# Patient Record
Sex: FEMALE | Race: WHITE | NOT HISPANIC OR LATINO | Employment: OTHER | URBAN - METROPOLITAN AREA
[De-identification: names, ages, dates, MRNs, and addresses within clinical notes are randomized per-mention and may not be internally consistent; named-entity substitution may affect disease eponyms.]

---

## 2018-03-06 PROBLEM — R04.0 RECURRENT EPISTAXIS: Status: ACTIVE | Noted: 2018-03-06

## 2018-03-06 PROBLEM — I26.92 ACUTE SADDLE PULMONARY EMBOLISM WITHOUT ACUTE COR PULMONALE: Status: ACTIVE | Noted: 2018-03-06

## 2018-03-06 PROBLEM — I82.412 ACUTE DEEP VEIN THROMBOSIS (DVT) OF FEMORAL VEIN OF LEFT LOWER EXTREMITY: Status: ACTIVE | Noted: 2018-03-06

## 2018-03-06 PROBLEM — Z86.2 H/O IRON DEFICIENCY ANEMIA: Status: ACTIVE | Noted: 2018-03-06

## 2018-03-06 PROBLEM — I26.99 OTHER PULMONARY EMBOLISM WITHOUT ACUTE COR PULMONALE: Status: ACTIVE | Noted: 2018-03-06

## 2018-03-06 PROBLEM — I82.4Y2 ACUTE DEEP VEIN THROMBOSIS (DVT) OF PROXIMAL VEIN OF LEFT LOWER EXTREMITY: Status: ACTIVE | Noted: 2018-03-06

## 2020-07-13 ENCOUNTER — OFFICE VISIT (OUTPATIENT)
Dept: OBSTETRICS AND GYNECOLOGY | Facility: CLINIC | Age: 65
End: 2020-07-13
Payer: COMMERCIAL

## 2020-07-13 VITALS — BODY MASS INDEX: 27.46 KG/M2 | WEIGHT: 165 LBS

## 2020-07-13 DIAGNOSIS — Z90.711 S/P LAPAROSCOPIC SUPRACERVICAL HYSTERECTOMY: ICD-10-CM

## 2020-07-13 DIAGNOSIS — N94.10 DYSPAREUNIA IN FEMALE: ICD-10-CM

## 2020-07-13 DIAGNOSIS — Z01.419 GYNECOLOGIC EXAM NORMAL: ICD-10-CM

## 2020-07-13 DIAGNOSIS — N95.2 ATROPHIC VAGINITIS: Primary | ICD-10-CM

## 2020-07-13 PROCEDURE — 87624 HPV HI-RISK TYP POOLED RSLT: CPT

## 2020-07-13 PROCEDURE — 99999 PR PBB SHADOW E&M-NEW PATIENT-LVL III: ICD-10-PCS | Mod: PBBFAC,,, | Performed by: OBSTETRICS & GYNECOLOGY

## 2020-07-13 PROCEDURE — 99386 PREV VISIT NEW AGE 40-64: CPT | Mod: S$GLB,,, | Performed by: OBSTETRICS & GYNECOLOGY

## 2020-07-13 PROCEDURE — 99999 PR PBB SHADOW E&M-NEW PATIENT-LVL III: CPT | Mod: PBBFAC,,, | Performed by: OBSTETRICS & GYNECOLOGY

## 2020-07-13 PROCEDURE — 3008F BODY MASS INDEX DOCD: CPT | Mod: CPTII,S$GLB,, | Performed by: OBSTETRICS & GYNECOLOGY

## 2020-07-13 PROCEDURE — 88175 CYTOPATH C/V AUTO FLUID REDO: CPT

## 2020-07-13 PROCEDURE — 3008F PR BODY MASS INDEX (BMI) DOCUMENTED: ICD-10-PCS | Mod: CPTII,S$GLB,, | Performed by: OBSTETRICS & GYNECOLOGY

## 2020-07-13 PROCEDURE — 99386 PR PREVENTIVE VISIT,NEW,40-64: ICD-10-PCS | Mod: S$GLB,,, | Performed by: OBSTETRICS & GYNECOLOGY

## 2020-07-13 RX ORDER — ESTRADIOL 10 UG/1
10 INSERT VAGINAL WEEKLY
Qty: 4 TABLET | Refills: 1 | Status: SHIPPED | OUTPATIENT
Start: 2020-07-13 | End: 2020-09-14 | Stop reason: SDUPTHER

## 2020-07-13 NOTE — PATIENT INSTRUCTIONS
Please try Miralax, 1 capful daily (aka ClearLax at [facility]).    During Vagifem treatment, a minor degree of systemic absorption may occur in some patients, especially during the first two weeks of once-daily administration. However, average plasma E2 concentrations (Cave (0-24)) at all evaluated days remained within the normal postmenopausal range in all subjects (see section 5.2).    Vagifem 10 micrograms vaginal tablets - Summary of Product ...[email or web address]  emc  product  smpc

## 2020-07-20 LAB
HPV HR 12 DNA SPEC QL NAA+PROBE: NEGATIVE
HPV16 AG SPEC QL: NEGATIVE
HPV18 DNA SPEC QL NAA+PROBE: NEGATIVE

## 2020-07-23 LAB
FINAL PATHOLOGIC DIAGNOSIS: NORMAL
Lab: NORMAL

## 2020-09-14 DIAGNOSIS — N95.2 ATROPHIC VAGINITIS: ICD-10-CM

## 2020-09-14 DIAGNOSIS — N94.10 DYSPAREUNIA IN FEMALE: ICD-10-CM

## 2020-09-14 RX ORDER — ESTRADIOL 10 UG/1
10 INSERT VAGINAL WEEKLY
Qty: 4 TABLET | Refills: 9 | Status: SHIPPED | OUTPATIENT
Start: 2020-09-14 | End: 2021-10-02

## 2020-09-14 NOTE — TELEPHONE ENCOUNTER
----- Message from Lon Hagan sent at 9/14/2020  3:10 PM CDT -----  Regarding: estradioL (VAGIFEM) 10 mcg Tab  Type:  RX Refill Request    Who Called:  patient  Refill or New Rx: refill  RX Name and Strength: estradioL (VAGIFEM) 10 mcg Tab  How is the patient currently taking it? (ex. 1XDay): 1Xweek  Is this a 30 day or 90 day RX: 30 day  Preferred Pharmacy with phone number: [facility] [place] [address] [address]  Local or Mail Order: Local  Ordering Provider: Rio  Would the patient rather a call back or a response via [facility]?  Call back  Best Call Back Number: [phone]  Additional Information:  she use her last dose tomorrow so needs a refill until she sees the doctor in 2 weeks

## 2020-09-14 NOTE — TELEPHONE ENCOUNTER
Patient rescheduled appointment for October 2 and would like to receive a refill of her medication until her appointment.

## 2020-09-22 DIAGNOSIS — Z12.31 ENCOUNTER FOR SCREENING MAMMOGRAM FOR MALIGNANT NEOPLASM OF BREAST: Primary | ICD-10-CM

## 2020-10-13 ENCOUNTER — HOSPITAL ENCOUNTER (OUTPATIENT)
Dept: RADIOLOGY | Facility: HOSPITAL | Age: 65
Discharge: HOME | End: 2020-10-13
Attending: INTERNAL MEDICINE
Payer: COMMERCIAL

## 2020-10-13 VITALS — BODY MASS INDEX: 27.49 KG/M2 | HEIGHT: 65 IN | WEIGHT: 165 LBS

## 2020-10-13 DIAGNOSIS — Z12.31 ENCOUNTER FOR SCREENING MAMMOGRAM FOR MALIGNANT NEOPLASM OF BREAST: ICD-10-CM

## 2020-10-13 PROCEDURE — 77067 SCR MAMMO BI INCL CAD: CPT | Mod: TC

## 2021-02-24 ENCOUNTER — IMMUNIZATION (OUTPATIENT)
Dept: OBSTETRICS AND GYNECOLOGY | Facility: CLINIC | Age: 66
End: 2021-02-24
Payer: COMMERCIAL

## 2021-02-24 DIAGNOSIS — Z23 NEED FOR VACCINATION: Primary | ICD-10-CM

## 2021-02-24 PROCEDURE — 91300 COVID-19, MRNA, LNP-S, PF, 30 MCG/0.3 ML DOSE VACCINE: CPT | Mod: PBBFAC | Performed by: ANESTHESIOLOGY

## 2021-03-17 ENCOUNTER — IMMUNIZATION (OUTPATIENT)
Dept: OBSTETRICS AND GYNECOLOGY | Facility: CLINIC | Age: 66
End: 2021-03-17
Payer: COMMERCIAL

## 2021-03-17 DIAGNOSIS — Z23 NEED FOR VACCINATION: Primary | ICD-10-CM

## 2021-03-17 PROCEDURE — 0002A COVID-19, MRNA, LNP-S, PF, 30 MCG/0.3 ML DOSE VACCINE: CPT | Mod: CV19,,, | Performed by: ANESTHESIOLOGY

## 2021-03-17 PROCEDURE — 91300 COVID-19, MRNA, LNP-S, PF, 30 MCG/0.3 ML DOSE VACCINE: ICD-10-PCS | Mod: ,,, | Performed by: ANESTHESIOLOGY

## 2021-03-17 PROCEDURE — 0002A COVID-19, MRNA, LNP-S, PF, 30 MCG/0.3 ML DOSE VACCINE: ICD-10-PCS | Mod: CV19,,, | Performed by: ANESTHESIOLOGY

## 2021-03-17 PROCEDURE — 91300 COVID-19, MRNA, LNP-S, PF, 30 MCG/0.3 ML DOSE VACCINE: CPT | Mod: ,,, | Performed by: ANESTHESIOLOGY

## 2021-10-14 ENCOUNTER — IMMUNIZATION (OUTPATIENT)
Dept: OBSTETRICS AND GYNECOLOGY | Facility: CLINIC | Age: 66
End: 2021-10-14
Payer: COMMERCIAL

## 2021-10-14 DIAGNOSIS — Z12.31 ENCOUNTER FOR SCREENING MAMMOGRAM FOR MALIGNANT NEOPLASM OF BREAST: Primary | ICD-10-CM

## 2021-10-14 DIAGNOSIS — Z23 NEED FOR VACCINATION: Primary | ICD-10-CM

## 2021-10-14 PROCEDURE — 0003A COVID-19, MRNA, LNP-S, PF, 30 MCG/0.3 ML DOSE VACCINE: CPT | Mod: PBBFAC | Performed by: ANESTHESIOLOGY

## 2021-10-14 PROCEDURE — 91300 COVID-19, MRNA, LNP-S, PF, 30 MCG/0.3 ML DOSE VACCINE: CPT | Mod: PBBFAC | Performed by: ANESTHESIOLOGY

## 2021-10-15 ENCOUNTER — HOSPITAL ENCOUNTER (OUTPATIENT)
Dept: RADIOLOGY | Facility: HOSPITAL | Age: 66
Discharge: HOME | End: 2021-10-15
Attending: INTERNAL MEDICINE
Payer: COMMERCIAL

## 2021-10-15 VITALS — BODY MASS INDEX: 27.49 KG/M2 | HEIGHT: 65 IN | WEIGHT: 165 LBS

## 2021-10-15 DIAGNOSIS — Z12.31 ENCOUNTER FOR SCREENING MAMMOGRAM FOR MALIGNANT NEOPLASM OF BREAST: ICD-10-CM

## 2021-10-15 PROCEDURE — 77067 SCR MAMMO BI INCL CAD: CPT | Mod: TC

## 2021-11-24 ENCOUNTER — TELEPHONE (OUTPATIENT)
Dept: OBSTETRICS AND GYNECOLOGY | Facility: CLINIC | Age: 66
End: 2021-11-24
Payer: COMMERCIAL

## 2022-03-16 ENCOUNTER — LAB VISIT (OUTPATIENT)
Dept: LAB | Facility: HOSPITAL | Age: 67
End: 2022-03-16
Attending: INTERNAL MEDICINE
Payer: COMMERCIAL

## 2022-03-16 DIAGNOSIS — I10 ESSENTIAL HYPERTENSION, MALIGNANT: ICD-10-CM

## 2022-03-16 DIAGNOSIS — E53.0 RIBOFLAVIN DEFICIENCY: ICD-10-CM

## 2022-03-16 DIAGNOSIS — G47.61 PERIODIC LIMB MOVEMENT DISORDER: ICD-10-CM

## 2022-03-16 DIAGNOSIS — D64.9 ANEMIA, UNSPECIFIED: Primary | ICD-10-CM

## 2022-03-16 DIAGNOSIS — T81.718A IATROGENIC PULMONARY EMBOLISM AND INFARCTION: ICD-10-CM

## 2022-03-16 DIAGNOSIS — I26.99 IATROGENIC PULMONARY EMBOLISM AND INFARCTION: ICD-10-CM

## 2022-03-16 LAB
ALBUMIN SERPL BCP-MCNC: 4.2 G/DL (ref 3.5–5.2)
ALP SERPL-CCNC: 97 U/L (ref 55–135)
ALT SERPL W/O P-5'-P-CCNC: 25 U/L (ref 10–44)
ANION GAP SERPL CALC-SCNC: 6 MMOL/L (ref 8–16)
AST SERPL-CCNC: 13 U/L (ref 10–40)
BASOPHILS # BLD AUTO: 0.07 K/UL (ref 0–0.2)
BASOPHILS NFR BLD: 1.1 % (ref 0–1.9)
BILIRUB SERPL-MCNC: 0.5 MG/DL (ref 0.1–1)
BUN SERPL-MCNC: 13 MG/DL (ref 8–23)
CALCIUM SERPL-MCNC: 9.3 MG/DL (ref 8.7–10.5)
CHLORIDE SERPL-SCNC: 104 MMOL/L (ref 95–110)
CO2 SERPL-SCNC: 28 MMOL/L (ref 23–29)
CREAT SERPL-MCNC: 0.7 MG/DL (ref 0.5–1.4)
DIFFERENTIAL METHOD: ABNORMAL
EOSINOPHIL # BLD AUTO: 0.1 K/UL (ref 0–0.5)
EOSINOPHIL NFR BLD: 1.8 % (ref 0–8)
ERYTHROCYTE [DISTWIDTH] IN BLOOD BY AUTOMATED COUNT: 12.6 % (ref 11.5–14.5)
EST. GFR  (AFRICAN AMERICAN): >60 ML/MIN/1.73 M^2
EST. GFR  (NON AFRICAN AMERICAN): >60 ML/MIN/1.73 M^2
FERRITIN SERPL-MCNC: 35 NG/ML (ref 20–300)
GLUCOSE SERPL-MCNC: 103 MG/DL (ref 70–110)
HCT VFR BLD AUTO: 48.6 % (ref 37–48.5)
HGB BLD-MCNC: 15.4 G/DL (ref 12–16)
IMM GRANULOCYTES # BLD AUTO: 0.03 K/UL (ref 0–0.04)
IMM GRANULOCYTES NFR BLD AUTO: 0.5 % (ref 0–0.5)
LYMPHOCYTES # BLD AUTO: 2.4 K/UL (ref 1–4.8)
LYMPHOCYTES NFR BLD: 37.3 % (ref 18–48)
MCH RBC QN AUTO: 28.9 PG (ref 27–31)
MCHC RBC AUTO-ENTMCNC: 31.7 G/DL (ref 32–36)
MCV RBC AUTO: 91 FL (ref 82–98)
MONOCYTES # BLD AUTO: 0.5 K/UL (ref 0.3–1)
MONOCYTES NFR BLD: 7.3 % (ref 4–15)
NEUTROPHILS # BLD AUTO: 3.4 K/UL (ref 1.8–7.7)
NEUTROPHILS NFR BLD: 52 % (ref 38–73)
NRBC BLD-RTO: 0 /100 WBC
PLATELET # BLD AUTO: 314 K/UL (ref 150–450)
PMV BLD AUTO: 10.9 FL (ref 9.2–12.9)
POTASSIUM SERPL-SCNC: 4.5 MMOL/L (ref 3.5–5.1)
PROT SERPL-MCNC: 8.2 G/DL (ref 6–8.4)
RBC # BLD AUTO: 5.32 M/UL (ref 4–5.4)
SODIUM SERPL-SCNC: 138 MMOL/L (ref 136–145)
TSH SERPL DL<=0.005 MIU/L-ACNC: 2.47 UIU/ML (ref 0.4–4)
VIT B12 SERPL-MCNC: 114 PG/ML (ref 210–950)
WBC # BLD AUTO: 6.55 K/UL (ref 3.9–12.7)

## 2022-03-16 PROCEDURE — 36415 COLL VENOUS BLD VENIPUNCTURE: CPT | Performed by: INTERNAL MEDICINE

## 2022-03-16 PROCEDURE — 82607 VITAMIN B-12: CPT | Performed by: INTERNAL MEDICINE

## 2022-03-16 PROCEDURE — 84443 ASSAY THYROID STIM HORMONE: CPT | Performed by: INTERNAL MEDICINE

## 2022-03-16 PROCEDURE — 82728 ASSAY OF FERRITIN: CPT | Performed by: INTERNAL MEDICINE

## 2022-03-16 PROCEDURE — 83921 ORGANIC ACID SINGLE QUANT: CPT | Performed by: INTERNAL MEDICINE

## 2022-03-16 PROCEDURE — 80053 COMPREHEN METABOLIC PANEL: CPT | Performed by: INTERNAL MEDICINE

## 2022-03-16 PROCEDURE — 85025 COMPLETE CBC W/AUTO DIFF WBC: CPT | Performed by: INTERNAL MEDICINE

## 2022-03-22 LAB — METHYLMALONATE SERPL-SCNC: 0.44 UMOL/L

## 2022-04-25 ENCOUNTER — HOSPITAL ENCOUNTER (EMERGENCY)
Facility: HOSPITAL | Age: 67
Discharge: HOME | End: 2022-04-25
Attending: EMERGENCY MEDICINE
Payer: COMMERCIAL

## 2022-04-25 VITALS
HEART RATE: 77 BPM | RESPIRATION RATE: 16 BRPM | WEIGHT: 174 LBS | SYSTOLIC BLOOD PRESSURE: 151 MMHG | BODY MASS INDEX: 28.96 KG/M2 | OXYGEN SATURATION: 97 % | TEMPERATURE: 98 F | DIASTOLIC BLOOD PRESSURE: 72 MMHG

## 2022-04-25 DIAGNOSIS — R04.0 RIGHT-SIDED EPISTAXIS: Primary | ICD-10-CM

## 2022-04-25 DIAGNOSIS — R04.0 RECURRENT EPISTAXIS: ICD-10-CM

## 2022-04-25 LAB
ALBUMIN SERPL BCP-MCNC: 3.7 G/DL (ref 3.5–5.2)
ALP SERPL-CCNC: 100 U/L (ref 55–135)
ALT SERPL W/O P-5'-P-CCNC: 19 U/L (ref 10–44)
ANION GAP SERPL CALC-SCNC: 7 MMOL/L (ref 8–16)
AST SERPL-CCNC: 11 U/L (ref 10–40)
BASOPHILS # BLD AUTO: 0.06 K/UL (ref 0–0.2)
BASOPHILS NFR BLD: 0.8 % (ref 0–1.9)
BILIRUB SERPL-MCNC: 0.3 MG/DL (ref 0.1–1)
BUN SERPL-MCNC: 16 MG/DL (ref 8–23)
CALCIUM SERPL-MCNC: 9.2 MG/DL (ref 8.7–10.5)
CHLORIDE SERPL-SCNC: 110 MMOL/L (ref 95–110)
CO2 SERPL-SCNC: 25 MMOL/L (ref 23–29)
CREAT SERPL-MCNC: 0.8 MG/DL (ref 0.5–1.4)
DIFFERENTIAL METHOD: NORMAL
EOSINOPHIL # BLD AUTO: 0.2 K/UL (ref 0–0.5)
EOSINOPHIL NFR BLD: 2.2 % (ref 0–8)
ERYTHROCYTE [DISTWIDTH] IN BLOOD BY AUTOMATED COUNT: 12.8 % (ref 11.5–14.5)
EST. GFR  (AFRICAN AMERICAN): >60 ML/MIN/1.73 M^2
EST. GFR  (NON AFRICAN AMERICAN): >60 ML/MIN/1.73 M^2
GLUCOSE SERPL-MCNC: 152 MG/DL (ref 70–110)
HCT VFR BLD AUTO: 43.8 % (ref 37–48.5)
HGB BLD-MCNC: 14.5 G/DL (ref 12–16)
IMM GRANULOCYTES # BLD AUTO: 0.03 K/UL (ref 0–0.04)
IMM GRANULOCYTES NFR BLD AUTO: 0.4 % (ref 0–0.5)
INR PPP: 1 (ref 0.8–1.2)
LYMPHOCYTES # BLD AUTO: 3.2 K/UL (ref 1–4.8)
LYMPHOCYTES NFR BLD: 42.1 % (ref 18–48)
MCH RBC QN AUTO: 29.1 PG (ref 27–31)
MCHC RBC AUTO-ENTMCNC: 33.1 G/DL (ref 32–36)
MCV RBC AUTO: 88 FL (ref 82–98)
MONOCYTES # BLD AUTO: 0.6 K/UL (ref 0.3–1)
MONOCYTES NFR BLD: 7.2 % (ref 4–15)
NEUTROPHILS # BLD AUTO: 3.6 K/UL (ref 1.8–7.7)
NEUTROPHILS NFR BLD: 47.3 % (ref 38–73)
NRBC BLD-RTO: 0 /100 WBC
PLATELET # BLD AUTO: 289 K/UL (ref 150–450)
PMV BLD AUTO: 9.9 FL (ref 9.2–12.9)
POTASSIUM SERPL-SCNC: 3.3 MMOL/L (ref 3.5–5.1)
PROT SERPL-MCNC: 7.8 G/DL (ref 6–8.4)
PROTHROMBIN TIME: 11.2 SEC (ref 9–12.5)
RBC # BLD AUTO: 4.98 M/UL (ref 4–5.4)
SODIUM SERPL-SCNC: 142 MMOL/L (ref 136–145)
WBC # BLD AUTO: 7.65 K/UL (ref 3.9–12.7)

## 2022-04-25 PROCEDURE — 30901 CONTROL OF NOSEBLEED: CPT | Mod: RT

## 2022-04-25 PROCEDURE — 85025 COMPLETE CBC W/AUTO DIFF WBC: CPT | Performed by: EMERGENCY MEDICINE

## 2022-04-25 PROCEDURE — 80053 COMPREHEN METABOLIC PANEL: CPT | Performed by: EMERGENCY MEDICINE

## 2022-04-25 PROCEDURE — 85610 PROTHROMBIN TIME: CPT | Performed by: EMERGENCY MEDICINE

## 2022-04-25 PROCEDURE — 25000003 PHARM REV CODE 250: Performed by: EMERGENCY MEDICINE

## 2022-04-25 PROCEDURE — 36415 COLL VENOUS BLD VENIPUNCTURE: CPT | Performed by: EMERGENCY MEDICINE

## 2022-04-25 PROCEDURE — 99283 EMERGENCY DEPT VISIT LOW MDM: CPT | Mod: 25

## 2022-04-25 RX ORDER — ONDANSETRON 4 MG/1
8 TABLET, ORALLY DISINTEGRATING ORAL
Status: COMPLETED | OUTPATIENT
Start: 2022-04-25 | End: 2022-04-25

## 2022-04-25 RX ADMIN — ONDANSETRON 8 MG: 4 TABLET, ORALLY DISINTEGRATING ORAL at 08:04

## 2022-04-25 RX ADMIN — PHENYLEPHRINE HYDROCHLORIDE 2 SPRAY: 0.5 SPRAY NASAL at 08:04

## 2022-04-26 NOTE — DISCHARGE INSTRUCTIONS
Apply an ice pack for approximately 15 minutes to your nasal bridge before sleep tonight.  Use over-the-counter Tylenol for pain.    Follow-up with your ENT tomorrow morning.      Use Ollie-Synephrine once every 6 hours to the left naris until seen by ENT

## 2022-04-26 NOTE — ED PROVIDER NOTES
[facility] 4[facility]       History     Chief Complaint   Patient presents with    Epistaxis     Epistaxis x 30 minutes. Denies trauma. Reports history of nosebleeds. On blood thinners.     Patient is 66-year-old female presenting to the emergency department with a nose bleed.  Patient has a history of DVT and PE who was on lifelong blood thinners who also has a history of recurrent nose bleeds.  She states that she had a spontaneous onset of bleeding from her right naris.  She denies any trauma she denies picking her nose she denies the use of any nasal sprays or medications or allergy medications.    She stated that she has held pressure without any stopping she tried using Afrin with no relief of symptoms.    She denies fever chills vomiting diarrhea constipation chest pain shortness of breath headache blurry vision.  She does report some mild nausea.    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    Recurrent epistaxis 3/6/2018     Past Surgical History:   Procedure Laterality Date    HEEL SPUR SURGERY      HYSTERECTOMY      WRIST SURGERY       Family History   Problem Relation Age of Onset    Coronary artery disease Mother     Tuberculosis Mother     Diabetes Father     Coronary artery disease Father     Cancer Sister     Coronary artery disease Sister     Hypertension Sister     Cancer Brother     No Known Problems Daughter     No  Known Problems Maternal Aunt     No Known Problems Maternal Uncle     No Known Problems Paternal Aunt     No Known Problems Paternal Uncle     No Known Problems Maternal Grandmother     No Known Problems Maternal Grandfather     No Known Problems Paternal Grandmother     No Known Problems Paternal Grandfather     Breast cancer Neg Hx     Ovarian cancer Neg Hx     BRCA 1/2 Neg Hx      Social History     Tobacco Use    Smoking status: Never Smoker    Smokeless tobacco: Never Used   Substance Use Topics    Alcohol use: Yes    Drug use: No     Review of Systems   Constitutional: Negative.    HENT: Negative.    Eyes: Negative.    Respiratory: Negative.    Cardiovascular: Negative.    Gastrointestinal: Negative.    Endocrine: Negative.    Genitourinary: Negative.    Musculoskeletal: Negative.    Allergic/Immunologic: Negative.    Neurological: Negative.    Hematological: Negative.    Psychiatric/Behavioral: Negative.    All other systems reviewed and are negative.      Physical Exam     Initial Vitals [04/25/22 1930]   BP Pulse Resp Temp SpO2   (!) 196/105 (!) 125 18 98.2 °F (36.8 °C) 97 %      MAP       --         Physical Exam    Nursing note and vitals reviewed.  Constitutional: She appears well-developed and well-nourished. No distress.   HENT:   Head: Normocephalic and atraumatic.   Nose: Nose normal.   Mouth/Throat: Oropharynx is clear and moist.   Steady bleeding from right naris.  Limited examination secondary to blood but no clear source of bleeding no clear evidence of septal hematoma or abrasion or vascular injury.  Left naris clear no bleeding.  No septal hematoma no abrasions or lesions noted   Eyes: Conjunctivae and EOM are normal. Pupils are equal, round, and reactive to light.   Neck: Neck supple.   Normal range of motion.  Cardiovascular: Regular rhythm.   Mild tachycardia   Pulmonary/Chest: Breath sounds normal. No respiratory distress. She has no wheezes.   Abdominal: Abdomen is soft.  Bowel sounds are normal.   Musculoskeletal:         General: Normal range of motion.      Cervical back: Normal range of motion and neck supple.     Neurological: She is alert and oriented to person, place, and time. She has normal strength.   Skin: Skin is warm and dry.         ED Course   Epistaxis Mgmt    Date/Time: 4/25/2022 7:50 PM  Performed by: Pan Castellano MD  Authorized by: Pan Castellano MD   Consent Done: Not Needed  Repair method: nasal balloon  Post-procedure assessment: bleeding stopped  Treatment complexity: simple  Patient tolerance: Patient tolerated the procedure well with no immediate complications        Labs Reviewed   COMPREHENSIVE METABOLIC PANEL - Abnormal; Notable for the following components:       Result Value    Potassium 3.3 (*)     Glucose 152 (*)     Anion Gap 7 (*)     All other components within normal limits   CBC W/ AUTO DIFFERENTIAL   PROTIME-INR          Imaging Results    None          Medications   phenylephrine HCL 0.5% nasal spray 2 spray (2 sprays Each Nostril Given 4/25/22 2009)   ondansetron disintegrating tablet 8 mg (8 mg Oral Given 4/25/22 2009)     Medical Decision Making:   Initial Assessment:   Patient is a 66-year-old female presenting to the emergency department with a nose bleed.  Patient has had nosebleeds in the past she is on blood thinners for DVTs and PEs.  She tells me that she has had no trauma she does not pick her nose she has not been using any medications that could cause mucosal irritation or drying.    She tried with simple nasal pressure at home with no relief.    Here in the emergency department I applied nasal bridge pressure for approximately 7-10 minutes absolutely 0 improvement in symptoms the patient stated blood was going down the back of her throat she was gagging.    I placed a 5.5 cm anterior nasal balloon.  Patient tolerated the procedure well.  Bleeding appears to have stopped.  Will give the patient some nausea medication will  check basic blood work including an INR and I will place Ollie-Synephrine into the left naris.  Will continue to monitor the patient here in the emergency department.  Pan Dykes  Attending ED  4/25/2022  8:08 PM    Patient has been monitored in the emergency department for approximately 1 hour post anterior rhino rocket placement.  Patient no longer has any bleeding.  On recheck the  balloon it appears as if the nose has accommodated to the balloon so additional air was added patient tolerated well.    Had a long discussion with the patient.  Described that we did basic blood work no gross abnormalities.  The plan will be discharge the patient says she has a local ENT I advised the patient to sleep upright in a chair this evening to prevent rolling over and accidentally pulling out the balloon.  Advised the patient take over-the-counter Tylenol for pain advised the patient to call and follow up with her ENT in the morning.    Patient voiced understanding of discharge plan return precautions and need for follow-up                        Clinical Impression:   Final diagnoses:  [R04.0] Right-sided epistaxis (Primary)  [R04.0] Recurrent epistaxis          ED Disposition Condition    Discharge Stable        ED Prescriptions     None        Follow-up Information     Follow up With Specialties Details Why Contact Info    Joy Abraham MD Internal Medicine In 1 month Call for appointment [address] [facility] [address]  [phone]      Emiliano Phipps III, MD Otolaryngology In 1 day Call for appointment [address] [facility]  [place] [address]  [phone]             Pan Dykes MD  04/25/22 [id number]

## 2022-06-17 PROBLEM — G25.81 RESTLESS LEG SYNDROME: Status: ACTIVE | Noted: 2022-06-17

## 2022-06-17 PROBLEM — E03.9 HYPOTHYROIDISM: Status: ACTIVE | Noted: 2022-06-17

## 2022-06-17 PROBLEM — M85.80 OSTEOPENIA: Status: ACTIVE | Noted: 2022-06-17

## 2022-06-17 PROBLEM — R73.01 IMPAIRED FASTING GLUCOSE: Status: ACTIVE | Noted: 2022-06-17

## 2022-06-17 PROBLEM — I10 BENIGN ESSENTIAL HYPERTENSION: Status: ACTIVE | Noted: 2022-06-17

## 2022-08-19 ENCOUNTER — IMMUNIZATION (OUTPATIENT)
Dept: PRIMARY CARE CLINIC | Facility: CLINIC | Age: 67
End: 2022-08-19
Payer: COMMERCIAL

## 2022-08-19 DIAGNOSIS — Z23 NEED FOR VACCINATION: Primary | ICD-10-CM

## 2022-08-19 PROCEDURE — 0054A COVID-19, MRNA, LNP-S, PF, 30 MCG/0.3 ML DOSE VACCINE (PFIZER): CPT | Mod: PBBFAC | Performed by: ANESTHESIOLOGY

## 2022-09-19 ENCOUNTER — LAB VISIT (OUTPATIENT)
Dept: LAB | Facility: HOSPITAL | Age: 67
End: 2022-09-19
Attending: INTERNAL MEDICINE
Payer: COMMERCIAL

## 2022-09-19 DIAGNOSIS — R73.01 IMPAIRED FASTING GLUCOSE: ICD-10-CM

## 2022-09-19 DIAGNOSIS — I10 HYPERTENSION, UNSPECIFIED TYPE: ICD-10-CM

## 2022-09-19 DIAGNOSIS — E53.8 VITAMIN B12 DEFICIENCY: ICD-10-CM

## 2022-09-19 DIAGNOSIS — E78.5 HYPERLIPIDEMIA, UNSPECIFIED HYPERLIPIDEMIA TYPE: ICD-10-CM

## 2022-09-19 DIAGNOSIS — E03.9 HYPOTHYROIDISM, UNSPECIFIED TYPE: ICD-10-CM

## 2022-09-19 LAB
ALBUMIN SERPL BCP-MCNC: 3.6 G/DL (ref 3.5–5.2)
ALP SERPL-CCNC: 89 U/L (ref 55–135)
ALT SERPL W/O P-5'-P-CCNC: 21 U/L (ref 10–44)
ANION GAP SERPL CALC-SCNC: 7 MMOL/L (ref 8–16)
AST SERPL-CCNC: 14 U/L (ref 10–40)
BASOPHILS # BLD AUTO: 0.05 K/UL (ref 0–0.2)
BASOPHILS NFR BLD: 0.8 % (ref 0–1.9)
BILIRUB SERPL-MCNC: 0.5 MG/DL (ref 0.1–1)
BUN SERPL-MCNC: 12 MG/DL (ref 8–23)
CALCIUM SERPL-MCNC: 9.1 MG/DL (ref 8.7–10.5)
CHLORIDE SERPL-SCNC: 108 MMOL/L (ref 95–110)
CHOLEST SERPL-MCNC: 147 MG/DL (ref 120–199)
CHOLEST/HDLC SERPL: 3.1 {RATIO} (ref 2–5)
CO2 SERPL-SCNC: 25 MMOL/L (ref 23–29)
CREAT SERPL-MCNC: 0.7 MG/DL (ref 0.5–1.4)
DIFFERENTIAL METHOD: NORMAL
EOSINOPHIL # BLD AUTO: 0.2 K/UL (ref 0–0.5)
EOSINOPHIL NFR BLD: 3.1 % (ref 0–8)
ERYTHROCYTE [DISTWIDTH] IN BLOOD BY AUTOMATED COUNT: 13.1 % (ref 11.5–14.5)
EST. GFR  (NO RACE VARIABLE): >60 ML/MIN/1.73 M^2
GLUCOSE SERPL-MCNC: 110 MG/DL (ref 70–110)
HCT VFR BLD AUTO: 44.2 % (ref 37–48.5)
HDLC SERPL-MCNC: 47 MG/DL (ref 40–75)
HDLC SERPL: 32 % (ref 20–50)
HGB BLD-MCNC: 14.2 G/DL (ref 12–16)
IMM GRANULOCYTES # BLD AUTO: 0.03 K/UL (ref 0–0.04)
IMM GRANULOCYTES NFR BLD AUTO: 0.5 % (ref 0–0.5)
LDLC SERPL CALC-MCNC: 74.8 MG/DL (ref 63–159)
LYMPHOCYTES # BLD AUTO: 3 K/UL (ref 1–4.8)
LYMPHOCYTES NFR BLD: 47 % (ref 18–48)
MCH RBC QN AUTO: 28.1 PG (ref 27–31)
MCHC RBC AUTO-ENTMCNC: 32.1 G/DL (ref 32–36)
MCV RBC AUTO: 87 FL (ref 82–98)
MONOCYTES # BLD AUTO: 0.5 K/UL (ref 0.3–1)
MONOCYTES NFR BLD: 7.7 % (ref 4–15)
NEUTROPHILS # BLD AUTO: 2.6 K/UL (ref 1.8–7.7)
NEUTROPHILS NFR BLD: 40.9 % (ref 38–73)
NONHDLC SERPL-MCNC: 100 MG/DL
NRBC BLD-RTO: 0 /100 WBC
PLATELET # BLD AUTO: 295 K/UL (ref 150–450)
PMV BLD AUTO: 10.5 FL (ref 9.2–12.9)
POTASSIUM SERPL-SCNC: 3.7 MMOL/L (ref 3.5–5.1)
PROT SERPL-MCNC: 7.8 G/DL (ref 6–8.4)
RBC # BLD AUTO: 5.06 M/UL (ref 4–5.4)
SODIUM SERPL-SCNC: 140 MMOL/L (ref 136–145)
T4 FREE SERPL-MCNC: 0.99 NG/DL (ref 0.71–1.51)
TRIGL SERPL-MCNC: 126 MG/DL (ref 30–150)
TSH SERPL DL<=0.005 MIU/L-ACNC: 4.03 UIU/ML (ref 0.4–4)
VIT B12 SERPL-MCNC: 427 PG/ML (ref 210–950)
WBC # BLD AUTO: 6.36 K/UL (ref 3.9–12.7)

## 2022-09-19 PROCEDURE — 36415 COLL VENOUS BLD VENIPUNCTURE: CPT | Performed by: INTERNAL MEDICINE

## 2022-09-19 PROCEDURE — 85025 COMPLETE CBC W/AUTO DIFF WBC: CPT | Performed by: INTERNAL MEDICINE

## 2022-09-19 PROCEDURE — 80053 COMPREHEN METABOLIC PANEL: CPT | Performed by: INTERNAL MEDICINE

## 2022-09-19 PROCEDURE — 82607 VITAMIN B-12: CPT | Performed by: INTERNAL MEDICINE

## 2022-09-19 PROCEDURE — 84439 ASSAY OF FREE THYROXINE: CPT | Performed by: INTERNAL MEDICINE

## 2022-09-19 PROCEDURE — 80061 LIPID PANEL: CPT | Performed by: INTERNAL MEDICINE

## 2022-09-19 PROCEDURE — 84443 ASSAY THYROID STIM HORMONE: CPT | Performed by: INTERNAL MEDICINE

## 2022-09-26 PROBLEM — Z00.00 WELLNESS EXAMINATION: Status: ACTIVE | Noted: 2022-09-26

## 2022-10-20 ENCOUNTER — HOSPITAL ENCOUNTER (OUTPATIENT)
Dept: RADIOLOGY | Facility: HOSPITAL | Age: 67
Discharge: HOME | End: 2022-10-20
Attending: INTERNAL MEDICINE
Payer: COMMERCIAL

## 2022-10-20 VITALS — WEIGHT: 174 LBS | HEIGHT: 65 IN | BODY MASS INDEX: 28.99 KG/M2

## 2022-10-20 DIAGNOSIS — Z12.31 SCREENING MAMMOGRAM FOR BREAST CANCER: ICD-10-CM

## 2022-10-20 DIAGNOSIS — Z78.0 POSTMENOPAUSAL: ICD-10-CM

## 2022-10-20 PROCEDURE — 77067 SCR MAMMO BI INCL CAD: CPT | Mod: TC

## 2022-10-20 PROCEDURE — 77063 BREAST TOMOSYNTHESIS BI: CPT | Mod: TC

## 2022-10-20 PROCEDURE — 77080 DXA BONE DENSITY AXIAL: CPT | Mod: TC

## 2022-12-26 PROBLEM — Z00.00 WELLNESS EXAMINATION: Status: RESOLVED | Noted: 2022-09-26 | Resolved: 2022-12-26

## 2023-04-18 PROBLEM — I48.91 ATRIAL FIBRILLATION: Status: ACTIVE | Noted: 2023-04-18

## 2023-06-16 PROBLEM — I48.0 PAROXYSMAL ATRIAL FIBRILLATION: Status: ACTIVE | Noted: 2023-04-18

## 2023-10-04 ENCOUNTER — PATIENT MESSAGE (OUTPATIENT)
Dept: ADMINISTRATIVE | Facility: OTHER | Age: 68
End: 2023-10-04
Payer: COMMERCIAL

## 2023-10-20 ENCOUNTER — LAB VISIT (OUTPATIENT)
Dept: LAB | Facility: HOSPITAL | Age: 68
End: 2023-10-20
Attending: INTERNAL MEDICINE
Payer: COMMERCIAL

## 2023-10-20 DIAGNOSIS — Z13.29 SCREENING FOR THYROID DISORDER: ICD-10-CM

## 2023-10-20 DIAGNOSIS — Z00.00 ROUTINE GENERAL MEDICAL EXAMINATION AT A HEALTH CARE FACILITY: ICD-10-CM

## 2023-10-20 DIAGNOSIS — Z13.1 SCREENING FOR DIABETES MELLITUS: ICD-10-CM

## 2023-10-20 DIAGNOSIS — Z13.220 SCREENING FOR LIPOID DISORDERS: ICD-10-CM

## 2023-10-20 DIAGNOSIS — Z13.21 SCREENING FOR MALNUTRITION: ICD-10-CM

## 2023-10-20 DIAGNOSIS — Z13.0 SCREENING FOR IRON DEFICIENCY ANEMIA: ICD-10-CM

## 2023-10-20 LAB
ALBUMIN SERPL BCP-MCNC: 3.6 G/DL (ref 3.5–5.2)
ALP SERPL-CCNC: 126 U/L (ref 55–135)
ALT SERPL W/O P-5'-P-CCNC: 31 U/L (ref 10–44)
ANION GAP SERPL CALC-SCNC: 6 MMOL/L (ref 3–11)
AST SERPL-CCNC: 11 U/L (ref 10–40)
BASOPHILS # BLD AUTO: 0.09 K/UL (ref 0–0.2)
BASOPHILS NFR BLD: 0.8 % (ref 0–1.9)
BILIRUB SERPL-MCNC: 0.4 MG/DL (ref 0.1–1)
BUN SERPL-MCNC: 20 MG/DL (ref 8–23)
CALCIUM SERPL-MCNC: 8.9 MG/DL (ref 8.7–10.5)
CHLORIDE SERPL-SCNC: 108 MMOL/L (ref 95–110)
CHOLEST SERPL-MCNC: 137 MG/DL (ref 120–199)
CHOLEST/HDLC SERPL: 2.8 {RATIO} (ref 2–5)
CO2 SERPL-SCNC: 25 MMOL/L (ref 23–29)
CREAT SERPL-MCNC: 0.8 MG/DL (ref 0.5–1.4)
DIFFERENTIAL METHOD: ABNORMAL
EOSINOPHIL # BLD AUTO: 0.3 K/UL (ref 0–0.5)
EOSINOPHIL NFR BLD: 2.2 % (ref 0–8)
ERYTHROCYTE [DISTWIDTH] IN BLOOD BY AUTOMATED COUNT: 13.2 % (ref 11.5–14.5)
EST. GFR  (NO RACE VARIABLE): >60 ML/MIN/1.73 M^2
GLUCOSE SERPL-MCNC: 102 MG/DL (ref 70–110)
HCT VFR BLD AUTO: 48.3 % (ref 37–48.5)
HDLC SERPL-MCNC: 49 MG/DL (ref 40–75)
HDLC SERPL: 35.8 % (ref 20–50)
HGB BLD-MCNC: 15.3 G/DL (ref 12–16)
IMM GRANULOCYTES # BLD AUTO: 0.12 K/UL (ref 0–0.04)
IMM GRANULOCYTES NFR BLD AUTO: 1 % (ref 0–0.5)
LDLC SERPL CALC-MCNC: 66.8 MG/DL (ref 63–159)
LYMPHOCYTES # BLD AUTO: 4.5 K/UL (ref 1–4.8)
LYMPHOCYTES NFR BLD: 38.3 % (ref 18–48)
MCH RBC QN AUTO: 28.1 PG (ref 27–31)
MCHC RBC AUTO-ENTMCNC: 31.7 G/DL (ref 32–36)
MCV RBC AUTO: 89 FL (ref 82–98)
MONOCYTES # BLD AUTO: 0.8 K/UL (ref 0.3–1)
MONOCYTES NFR BLD: 6.6 % (ref 4–15)
NEUTROPHILS # BLD AUTO: 6 K/UL (ref 1.8–7.7)
NEUTROPHILS NFR BLD: 51.1 % (ref 38–73)
NONHDLC SERPL-MCNC: 88 MG/DL
NRBC BLD-RTO: 0 /100 WBC
PLATELET # BLD AUTO: 342 K/UL (ref 150–450)
PMV BLD AUTO: 10.2 FL (ref 9.2–12.9)
POTASSIUM SERPL-SCNC: 3.9 MMOL/L (ref 3.5–5.1)
PROT SERPL-MCNC: 7.7 G/DL (ref 6–8.4)
RBC # BLD AUTO: 5.44 M/UL (ref 4–5.4)
SODIUM SERPL-SCNC: 139 MMOL/L (ref 136–145)
T4 FREE SERPL-MCNC: 0.96 NG/DL (ref 0.71–1.51)
TRIGL SERPL-MCNC: 106 MG/DL (ref 30–150)
TSH SERPL DL<=0.005 MIU/L-ACNC: 0.23 UIU/ML (ref 0.4–4)
VIT B12 SERPL-MCNC: 377 PG/ML (ref 210–950)
WBC # BLD AUTO: 11.78 K/UL (ref 3.9–12.7)

## 2023-10-20 PROCEDURE — 80053 COMPREHEN METABOLIC PANEL: CPT | Performed by: INTERNAL MEDICINE

## 2023-10-20 PROCEDURE — 84439 ASSAY OF FREE THYROXINE: CPT | Performed by: INTERNAL MEDICINE

## 2023-10-20 PROCEDURE — 80061 LIPID PANEL: CPT | Performed by: INTERNAL MEDICINE

## 2023-10-20 PROCEDURE — 82607 VITAMIN B-12: CPT | Performed by: INTERNAL MEDICINE

## 2023-10-20 PROCEDURE — 82306 VITAMIN D 25 HYDROXY: CPT | Performed by: INTERNAL MEDICINE

## 2023-10-20 PROCEDURE — 84443 ASSAY THYROID STIM HORMONE: CPT | Performed by: INTERNAL MEDICINE

## 2023-10-20 PROCEDURE — 36415 COLL VENOUS BLD VENIPUNCTURE: CPT | Performed by: INTERNAL MEDICINE

## 2023-10-20 PROCEDURE — 85025 COMPLETE CBC W/AUTO DIFF WBC: CPT | Performed by: INTERNAL MEDICINE

## 2023-10-21 LAB — 25(OH)D3+25(OH)D2 SERPL-MCNC: 34 NG/ML (ref 30–96)

## 2023-11-08 ENCOUNTER — HOSPITAL ENCOUNTER (OUTPATIENT)
Dept: RADIOLOGY | Facility: HOSPITAL | Age: 68
Discharge: HOME | End: 2023-11-08
Attending: ORTHOPAEDIC SURGERY
Payer: COMMERCIAL

## 2023-11-08 DIAGNOSIS — M25.061 HEMARTHROSIS OF RIGHT KNEE: Primary | ICD-10-CM

## 2023-11-08 DIAGNOSIS — M25.061 HEMARTHROSIS OF RIGHT KNEE: ICD-10-CM

## 2023-11-08 PROCEDURE — 73721 MRI JNT OF LWR EXTRE W/O DYE: CPT | Mod: TC,RT

## 2023-11-22 ENCOUNTER — LAB VISIT (OUTPATIENT)
Dept: LAB | Facility: HOSPITAL | Age: 68
End: 2023-11-22
Attending: INTERNAL MEDICINE
Payer: COMMERCIAL

## 2023-11-22 DIAGNOSIS — E03.9 HYPOTHYROIDISM, UNSPECIFIED TYPE: ICD-10-CM

## 2023-11-22 LAB
T4 FREE SERPL-MCNC: 0.87 NG/DL (ref 0.71–1.51)
TSH SERPL DL<=0.005 MIU/L-ACNC: 0.38 UIU/ML (ref 0.4–4)

## 2023-11-22 PROCEDURE — 84439 ASSAY OF FREE THYROXINE: CPT | Performed by: INTERNAL MEDICINE

## 2023-11-22 PROCEDURE — 84443 ASSAY THYROID STIM HORMONE: CPT | Performed by: INTERNAL MEDICINE

## 2023-11-22 PROCEDURE — 36415 COLL VENOUS BLD VENIPUNCTURE: CPT | Performed by: INTERNAL MEDICINE

## 2023-11-27 ENCOUNTER — HOSPITAL ENCOUNTER (OUTPATIENT)
Dept: RADIOLOGY | Facility: HOSPITAL | Age: 68
Discharge: HOME | End: 2023-11-27
Attending: INTERNAL MEDICINE
Payer: COMMERCIAL

## 2023-11-27 VITALS — WEIGHT: 167 LBS | BODY MASS INDEX: 27.82 KG/M2 | HEIGHT: 65 IN

## 2023-11-27 DIAGNOSIS — Z12.31 SCREENING MAMMOGRAM FOR BREAST CANCER: ICD-10-CM

## 2023-11-27 PROBLEM — S23.41XA SPRAIN OF RIBS: Status: ACTIVE | Noted: 2023-11-27

## 2023-11-27 PROCEDURE — 77067 SCR MAMMO BI INCL CAD: CPT | Mod: TC

## 2024-01-22 PROBLEM — Z00.00 WELLNESS EXAMINATION: Status: RESOLVED | Noted: 2022-09-26 | Resolved: 2024-01-22

## 2024-01-23 ENCOUNTER — LAB VISIT (OUTPATIENT)
Dept: LAB | Facility: HOSPITAL | Age: 69
End: 2024-01-23
Attending: INTERNAL MEDICINE
Payer: COMMERCIAL

## 2024-01-23 DIAGNOSIS — E03.8 OTHER SPECIFIED HYPOTHYROIDISM: ICD-10-CM

## 2024-01-23 LAB
T4 FREE SERPL-MCNC: 1.02 NG/DL (ref 0.71–1.51)
TSH SERPL DL<=0.005 MIU/L-ACNC: 0.52 UIU/ML (ref 0.4–4)

## 2024-01-23 PROCEDURE — 36415 COLL VENOUS BLD VENIPUNCTURE: CPT | Performed by: INTERNAL MEDICINE

## 2024-01-23 PROCEDURE — 84439 ASSAY OF FREE THYROXINE: CPT | Performed by: INTERNAL MEDICINE

## 2024-01-23 PROCEDURE — 84443 ASSAY THYROID STIM HORMONE: CPT | Performed by: INTERNAL MEDICINE

## 2024-02-27 ENCOUNTER — LAB VISIT (OUTPATIENT)
Dept: LAB | Facility: HOSPITAL | Age: 69
End: 2024-02-27
Attending: STUDENT IN AN ORGANIZED HEALTH CARE EDUCATION/TRAINING PROGRAM
Payer: COMMERCIAL

## 2024-02-27 ENCOUNTER — OFFICE VISIT (OUTPATIENT)
Dept: ENDOCRINOLOGY | Facility: CLINIC | Age: 69
End: 2024-02-27
Payer: COMMERCIAL

## 2024-02-27 VITALS — WEIGHT: 169.75 LBS | BODY MASS INDEX: 28.25 KG/M2

## 2024-02-27 DIAGNOSIS — E03.9 ACQUIRED HYPOTHYROIDISM: Primary | ICD-10-CM

## 2024-02-27 DIAGNOSIS — I48.0 PAROXYSMAL ATRIAL FIBRILLATION: ICD-10-CM

## 2024-02-27 DIAGNOSIS — R73.01 IMPAIRED FASTING GLUCOSE: ICD-10-CM

## 2024-02-27 DIAGNOSIS — E03.9 ACQUIRED HYPOTHYROIDISM: ICD-10-CM

## 2024-02-27 LAB
T3 SERPL-MCNC: 96 NG/DL (ref 60–180)
T4 FREE SERPL-MCNC: 0.77 NG/DL (ref 0.71–1.51)
THYROGLOB AB SERPL IA-ACNC: 8.4 IU/ML (ref 0–3.9)
THYROPEROXIDASE IGG SERPL-ACNC: 1128.1 IU/ML
TSH SERPL DL<=0.005 MIU/L-ACNC: 1.65 UIU/ML (ref 0.4–4)

## 2024-02-27 PROCEDURE — 84439 ASSAY OF FREE THYROXINE: CPT | Performed by: STUDENT IN AN ORGANIZED HEALTH CARE EDUCATION/TRAINING PROGRAM

## 2024-02-27 PROCEDURE — 99999 PR PBB SHADOW E&M-EST. PATIENT-LVL III: CPT | Mod: PBBFAC,,, | Performed by: STUDENT IN AN ORGANIZED HEALTH CARE EDUCATION/TRAINING PROGRAM

## 2024-02-27 PROCEDURE — 99204 OFFICE O/P NEW MOD 45 MIN: CPT | Mod: S$GLB,,, | Performed by: STUDENT IN AN ORGANIZED HEALTH CARE EDUCATION/TRAINING PROGRAM

## 2024-02-27 PROCEDURE — 3288F FALL RISK ASSESSMENT DOCD: CPT | Mod: CPTII,S$GLB,, | Performed by: STUDENT IN AN ORGANIZED HEALTH CARE EDUCATION/TRAINING PROGRAM

## 2024-02-27 PROCEDURE — 84443 ASSAY THYROID STIM HORMONE: CPT | Performed by: STUDENT IN AN ORGANIZED HEALTH CARE EDUCATION/TRAINING PROGRAM

## 2024-02-27 PROCEDURE — 86800 THYROGLOBULIN ANTIBODY: CPT | Performed by: STUDENT IN AN ORGANIZED HEALTH CARE EDUCATION/TRAINING PROGRAM

## 2024-02-27 PROCEDURE — 84480 ASSAY TRIIODOTHYRONINE (T3): CPT | Performed by: STUDENT IN AN ORGANIZED HEALTH CARE EDUCATION/TRAINING PROGRAM

## 2024-02-27 PROCEDURE — 3008F BODY MASS INDEX DOCD: CPT | Mod: CPTII,S$GLB,, | Performed by: STUDENT IN AN ORGANIZED HEALTH CARE EDUCATION/TRAINING PROGRAM

## 2024-02-27 PROCEDURE — 1160F RVW MEDS BY RX/DR IN RCRD: CPT | Mod: CPTII,S$GLB,, | Performed by: STUDENT IN AN ORGANIZED HEALTH CARE EDUCATION/TRAINING PROGRAM

## 2024-02-27 PROCEDURE — 1101F PT FALLS ASSESS-DOCD LE1/YR: CPT | Mod: CPTII,S$GLB,, | Performed by: STUDENT IN AN ORGANIZED HEALTH CARE EDUCATION/TRAINING PROGRAM

## 2024-02-27 PROCEDURE — 1159F MED LIST DOCD IN RCRD: CPT | Mod: CPTII,S$GLB,, | Performed by: STUDENT IN AN ORGANIZED HEALTH CARE EDUCATION/TRAINING PROGRAM

## 2024-02-27 PROCEDURE — 86376 MICROSOMAL ANTIBODY EACH: CPT | Performed by: STUDENT IN AN ORGANIZED HEALTH CARE EDUCATION/TRAINING PROGRAM

## 2024-02-27 PROCEDURE — 36415 COLL VENOUS BLD VENIPUNCTURE: CPT | Performed by: STUDENT IN AN ORGANIZED HEALTH CARE EDUCATION/TRAINING PROGRAM

## 2024-02-27 RX ORDER — ROSUVASTATIN CALCIUM 20 MG/1
20 TABLET, COATED ORAL NIGHTLY
COMMUNITY

## 2024-02-27 NOTE — PROGRESS NOTES
"Subjective:      Patient ID: Mei Corbin is a 68 y.o. female.    Chief Complaint:  Hypothyroidism    History of Present Illness  This is a 68 y.o. female. with a past medical history of HTN, Afib, DVT here for evaluation of abnormal thyroid tests.    Hypothyroidism    Etiology: Believes autoimmune    Currently not on levothyroxine - stopped in Oct 2023 due to low TSH    Lab Results   Component Value Date    TSH 1.650 02/27/2024 03/07/12 12:15 03/16/22 17:33 09/19/22 08:05 10/20/23 08:28 11/22/23 11:59 01/23/24 10:47   TSH 0.955 2.470 4.030 (H) 0.229 (L) 0.381 (L) 0.517   Free T4   0.99 0.96 0.87 1.02       Weight:   Wt Readings from Last 6 Encounters:   02/27/24 77 kg (169 lb 12.1 oz)   11/27/23 75.8 kg (167 lb)   10/23/23 75.8 kg (167 lb)   06/16/23 70.5 kg (155 lb 8 oz)   06/13/23 70.8 kg (156 lb)   04/18/23 73.9 kg (163 lb)       Noticed history of Afib s/p ablation and on low dose beta blocker  Feels like since stopping levothyroxine has gained weight       Latest Reference Range & Units 09/26/22 09:41 10/23/23 11:30   Hemoglobin A1C, POC % 5.6 6.2       Thyroid US - None in chart      Review of Systems  As above    Social and family history reviewed  Current medications and allergies reviewed    Objective:   Wt 77 kg (169 lb 12.1 oz)   BMI 28.25 kg/m²   Physical Exam  Alert, oriented    BP Readings from Last 1 Encounters:   10/23/23 120/68      Wt Readings from Last 1 Encounters:   02/27/24 0934 77 kg (169 lb 12.1 oz)     Body mass index is 28.25 kg/m².    Lab Review:   No results found for: "HGBA1C"  Lab Results   Component Value Date    CHOL 137 10/20/2023    HDL 49 10/20/2023    LDLCALC 66.8 10/20/2023    TRIG 106 10/20/2023    CHOLHDL 35.8 10/20/2023     Lab Results   Component Value Date     10/20/2023    K 3.9 10/20/2023     10/20/2023    CO2 25 10/20/2023     10/20/2023    BUN 20 10/20/2023    CREATININE 0.8 10/20/2023    CALCIUM 8.9 10/20/2023    PROT 7.7 10/20/2023    " ALBUMIN 3.6 10/20/2023    BILITOT 0.4 10/20/2023    ALKPHOS 126 10/20/2023    AST 11 10/20/2023    ALT 31 10/20/2023    ANIONGAP 6 10/20/2023    ESTGFRAFRICA >60.0 04/25/2022    EGFRNONAA >60.0 04/25/2022    TSH 1.650 02/27/2024       All pertinent labs reviewed    Assessment and Plan     Hypothyroidism  Explained need to be careful with over treatment as it can trigger arrhythmias  Will recheck levels along with antibodies, depending on levels could consider restarting low dose  Will run it by Cardiology first    Impaired fasting glucose  Last A1c 6.2% on POCT testing  Will check A1c in venous blood    Paroxysmal atrial fibrillation  Avoid over treatment with levothyroxine        Titi Koenig MD  Endocrinology

## 2024-02-27 NOTE — ASSESSMENT & PLAN NOTE
Explained need to be careful with over treatment as it can trigger arrhythmias  Will recheck levels along with antibodies, depending on levels could consider restarting low dose  Will run it by Cardiology first

## 2024-02-28 ENCOUNTER — PATIENT MESSAGE (OUTPATIENT)
Dept: ENDOCRINOLOGY | Facility: CLINIC | Age: 69
End: 2024-02-28
Payer: COMMERCIAL

## 2024-02-28 DIAGNOSIS — R73.01 IMPAIRED FASTING GLUCOSE: Primary | ICD-10-CM

## 2024-02-29 ENCOUNTER — PATIENT MESSAGE (OUTPATIENT)
Dept: ENDOCRINOLOGY | Facility: CLINIC | Age: 69
End: 2024-02-29
Payer: COMMERCIAL

## 2024-02-29 DIAGNOSIS — R73.01 IMPAIRED FASTING GLUCOSE: ICD-10-CM

## 2024-02-29 DIAGNOSIS — E03.9 ACQUIRED HYPOTHYROIDISM: Primary | ICD-10-CM

## 2024-02-29 RX ORDER — LEVOTHYROXINE SODIUM 25 UG/1
25 TABLET ORAL DAILY
Qty: 30 TABLET | Refills: 11 | Status: SHIPPED | OUTPATIENT
Start: 2024-02-29

## 2024-03-28 ENCOUNTER — LAB VISIT (OUTPATIENT)
Dept: LAB | Facility: HOSPITAL | Age: 69
End: 2024-03-28
Attending: STUDENT IN AN ORGANIZED HEALTH CARE EDUCATION/TRAINING PROGRAM
Payer: COMMERCIAL

## 2024-03-28 DIAGNOSIS — E03.9 ACQUIRED HYPOTHYROIDISM: ICD-10-CM

## 2024-03-28 DIAGNOSIS — R73.01 IMPAIRED FASTING GLUCOSE: ICD-10-CM

## 2024-03-28 LAB
ESTIMATED AVG GLUCOSE: 111 MG/DL (ref 68–131)
ESTIMATED AVG GLUCOSE: 111 MG/DL (ref 68–131)
HBA1C MFR BLD: 5.5 % (ref 4–5.6)
HBA1C MFR BLD: 5.5 % (ref 4–5.6)
T4 FREE SERPL-MCNC: 0.97 NG/DL (ref 0.71–1.51)
TSH SERPL DL<=0.005 MIU/L-ACNC: 2.72 UIU/ML (ref 0.4–4)

## 2024-03-28 PROCEDURE — 84439 ASSAY OF FREE THYROXINE: CPT | Performed by: STUDENT IN AN ORGANIZED HEALTH CARE EDUCATION/TRAINING PROGRAM

## 2024-03-28 PROCEDURE — 83036 HEMOGLOBIN GLYCOSYLATED A1C: CPT | Performed by: STUDENT IN AN ORGANIZED HEALTH CARE EDUCATION/TRAINING PROGRAM

## 2024-03-28 PROCEDURE — 84480 ASSAY TRIIODOTHYRONINE (T3): CPT | Performed by: STUDENT IN AN ORGANIZED HEALTH CARE EDUCATION/TRAINING PROGRAM

## 2024-03-28 PROCEDURE — 84443 ASSAY THYROID STIM HORMONE: CPT | Performed by: STUDENT IN AN ORGANIZED HEALTH CARE EDUCATION/TRAINING PROGRAM

## 2024-03-28 PROCEDURE — 36415 COLL VENOUS BLD VENIPUNCTURE: CPT | Performed by: STUDENT IN AN ORGANIZED HEALTH CARE EDUCATION/TRAINING PROGRAM

## 2024-03-29 LAB — T3 SERPL-MCNC: 99 NG/DL (ref 60–180)

## 2024-04-02 ENCOUNTER — PATIENT MESSAGE (OUTPATIENT)
Dept: ENDOCRINOLOGY | Facility: CLINIC | Age: 69
End: 2024-04-02
Payer: COMMERCIAL

## 2024-04-03 DIAGNOSIS — E03.9 ACQUIRED HYPOTHYROIDISM: Primary | ICD-10-CM

## 2024-05-30 ENCOUNTER — LAB VISIT (OUTPATIENT)
Dept: LAB | Facility: HOSPITAL | Age: 69
End: 2024-05-30
Attending: STUDENT IN AN ORGANIZED HEALTH CARE EDUCATION/TRAINING PROGRAM
Payer: COMMERCIAL

## 2024-05-30 DIAGNOSIS — E03.9 ACQUIRED HYPOTHYROIDISM: ICD-10-CM

## 2024-05-30 DIAGNOSIS — L30.9 DERMATITIS: Primary | ICD-10-CM

## 2024-05-30 LAB
T3 SERPL-MCNC: 89 NG/DL (ref 60–180)
T4 FREE SERPL-MCNC: 1.03 NG/DL (ref 0.71–1.51)
TSH SERPL DL<=0.005 MIU/L-ACNC: 0.78 UIU/ML (ref 0.4–4)

## 2024-05-30 PROCEDURE — 84480 ASSAY TRIIODOTHYRONINE (T3): CPT | Performed by: STUDENT IN AN ORGANIZED HEALTH CARE EDUCATION/TRAINING PROGRAM

## 2024-05-30 PROCEDURE — 84443 ASSAY THYROID STIM HORMONE: CPT | Performed by: STUDENT IN AN ORGANIZED HEALTH CARE EDUCATION/TRAINING PROGRAM

## 2024-05-30 PROCEDURE — 36415 COLL VENOUS BLD VENIPUNCTURE: CPT | Performed by: STUDENT IN AN ORGANIZED HEALTH CARE EDUCATION/TRAINING PROGRAM

## 2024-05-30 PROCEDURE — 84439 ASSAY OF FREE THYROXINE: CPT | Performed by: STUDENT IN AN ORGANIZED HEALTH CARE EDUCATION/TRAINING PROGRAM

## 2024-05-31 ENCOUNTER — PATIENT MESSAGE (OUTPATIENT)
Dept: ENDOCRINOLOGY | Facility: CLINIC | Age: 69
End: 2024-05-31
Payer: COMMERCIAL

## 2024-05-31 DIAGNOSIS — E03.9 ACQUIRED HYPOTHYROIDISM: Primary | ICD-10-CM

## 2024-06-03 ENCOUNTER — TELEPHONE (OUTPATIENT)
Dept: ENDOCRINOLOGY | Facility: CLINIC | Age: 69
End: 2024-06-03
Payer: COMMERCIAL

## 2024-06-03 NOTE — TELEPHONE ENCOUNTER
----- Message from Titi Koenig MD sent at 5/31/2024 11:07 AM CDT -----  Labs 2-3 days before next appointment.    Thanks

## 2024-10-21 ENCOUNTER — PATIENT MESSAGE (OUTPATIENT)
Dept: FAMILY MEDICINE | Facility: CLINIC | Age: 69
End: 2024-10-21
Payer: COMMERCIAL

## 2024-10-21 DIAGNOSIS — Z12.11 SCREENING FOR COLON CANCER: ICD-10-CM

## 2024-10-24 ENCOUNTER — LAB VISIT (OUTPATIENT)
Dept: LAB | Facility: HOSPITAL | Age: 69
End: 2024-10-24
Attending: INTERNAL MEDICINE
Payer: COMMERCIAL

## 2024-10-24 DIAGNOSIS — R73.01 IMPAIRED FASTING GLUCOSE: ICD-10-CM

## 2024-10-24 DIAGNOSIS — I10 BENIGN ESSENTIAL HYPERTENSION: ICD-10-CM

## 2024-10-24 DIAGNOSIS — Z13.220 SCREENING FOR LIPOID DISORDERS: ICD-10-CM

## 2024-10-24 DIAGNOSIS — Z13.0 SCREENING FOR IRON DEFICIENCY ANEMIA: ICD-10-CM

## 2024-10-24 DIAGNOSIS — Z00.00 ROUTINE GENERAL MEDICAL EXAMINATION AT A HEALTH CARE FACILITY: ICD-10-CM

## 2024-10-24 DIAGNOSIS — Z13.21 SCREENING FOR MALNUTRITION: ICD-10-CM

## 2024-10-24 LAB
ALBUMIN SERPL BCP-MCNC: 3.9 G/DL (ref 3.5–5.2)
ALP SERPL-CCNC: 94 U/L (ref 55–135)
ALT SERPL W/O P-5'-P-CCNC: 28 U/L (ref 10–44)
ANION GAP SERPL CALC-SCNC: 11 MMOL/L (ref 3–11)
AST SERPL-CCNC: 18 U/L (ref 10–40)
BASOPHILS # BLD AUTO: 0.06 K/UL (ref 0–0.2)
BASOPHILS NFR BLD: 0.7 % (ref 0–1.9)
BILIRUB SERPL-MCNC: 0.4 MG/DL (ref 0.1–1)
BUN SERPL-MCNC: 16 MG/DL (ref 8–23)
CALCIUM SERPL-MCNC: 9.3 MG/DL (ref 8.7–10.5)
CHLORIDE SERPL-SCNC: 103 MMOL/L (ref 95–110)
CHOLEST SERPL-MCNC: 102 MG/DL (ref 120–199)
CHOLEST/HDLC SERPL: 1.9 {RATIO} (ref 2–5)
CO2 SERPL-SCNC: 24 MMOL/L (ref 23–29)
CREAT SERPL-MCNC: 0.8 MG/DL (ref 0.5–1.4)
DIFFERENTIAL METHOD BLD: ABNORMAL
EOSINOPHIL # BLD AUTO: 0.2 K/UL (ref 0–0.5)
EOSINOPHIL NFR BLD: 2 % (ref 0–8)
ERYTHROCYTE [DISTWIDTH] IN BLOOD BY AUTOMATED COUNT: 12.8 % (ref 11.5–14.5)
EST. GFR  (NO RACE VARIABLE): >60 ML/MIN/1.73 M^2
ESTIMATED AVG GLUCOSE: 114 MG/DL (ref 68–131)
GLUCOSE SERPL-MCNC: 100 MG/DL (ref 70–110)
HBA1C MFR BLD: 5.6 % (ref 4–5.6)
HCT VFR BLD AUTO: 45.5 % (ref 37–48.5)
HDLC SERPL-MCNC: 53 MG/DL (ref 40–75)
HDLC SERPL: 52 % (ref 20–50)
HGB BLD-MCNC: 15 G/DL (ref 12–16)
IMM GRANULOCYTES # BLD AUTO: 0.05 K/UL (ref 0–0.04)
IMM GRANULOCYTES NFR BLD AUTO: 0.6 % (ref 0–0.5)
LDLC SERPL CALC-MCNC: 37 MG/DL (ref 63–159)
LYMPHOCYTES # BLD AUTO: 3 K/UL (ref 1–4.8)
LYMPHOCYTES NFR BLD: 33 % (ref 18–48)
MCH RBC QN AUTO: 29.8 PG (ref 27–31)
MCHC RBC AUTO-ENTMCNC: 33 G/DL (ref 32–36)
MCV RBC AUTO: 90 FL (ref 82–98)
MONOCYTES # BLD AUTO: 0.7 K/UL (ref 0.3–1)
MONOCYTES NFR BLD: 8 % (ref 4–15)
NEUTROPHILS # BLD AUTO: 5 K/UL (ref 1.8–7.7)
NEUTROPHILS NFR BLD: 55.7 % (ref 38–73)
NONHDLC SERPL-MCNC: 49 MG/DL
NRBC BLD-RTO: 0 /100 WBC
PLATELET # BLD AUTO: 285 K/UL (ref 150–450)
PMV BLD AUTO: 10.8 FL (ref 9.2–12.9)
POTASSIUM SERPL-SCNC: 3.9 MMOL/L (ref 3.5–5.1)
PROT SERPL-MCNC: 8.3 G/DL (ref 6–8.4)
RBC # BLD AUTO: 5.04 M/UL (ref 4–5.4)
SODIUM SERPL-SCNC: 138 MMOL/L (ref 136–145)
TRIGL SERPL-MCNC: 60 MG/DL (ref 30–150)
WBC # BLD AUTO: 8.97 K/UL (ref 3.9–12.7)

## 2024-10-24 PROCEDURE — 83036 HEMOGLOBIN GLYCOSYLATED A1C: CPT | Performed by: INTERNAL MEDICINE

## 2024-10-24 PROCEDURE — 36415 COLL VENOUS BLD VENIPUNCTURE: CPT | Performed by: INTERNAL MEDICINE

## 2024-10-24 PROCEDURE — 80053 COMPREHEN METABOLIC PANEL: CPT | Performed by: INTERNAL MEDICINE

## 2024-10-24 PROCEDURE — 80061 LIPID PANEL: CPT | Performed by: INTERNAL MEDICINE

## 2024-10-24 PROCEDURE — 85025 COMPLETE CBC W/AUTO DIFF WBC: CPT | Performed by: INTERNAL MEDICINE

## 2024-11-26 ENCOUNTER — LAB VISIT (OUTPATIENT)
Dept: LAB | Facility: HOSPITAL | Age: 69
End: 2024-11-26
Attending: INTERNAL MEDICINE
Payer: COMMERCIAL

## 2024-11-26 DIAGNOSIS — Z13.21 ENCOUNTER FOR VITAMIN DEFICIENCY SCREENING: ICD-10-CM

## 2024-11-26 DIAGNOSIS — Z00.00 WELLNESS EXAMINATION: ICD-10-CM

## 2024-11-26 LAB — VIT B12 SERPL-MCNC: 418 PG/ML (ref 210–950)

## 2024-11-26 PROCEDURE — 82607 VITAMIN B-12: CPT | Performed by: INTERNAL MEDICINE

## 2024-11-26 PROCEDURE — 36415 COLL VENOUS BLD VENIPUNCTURE: CPT | Performed by: INTERNAL MEDICINE

## 2024-12-10 ENCOUNTER — HOSPITAL ENCOUNTER (OUTPATIENT)
Dept: RADIOLOGY | Facility: HOSPITAL | Age: 69
Discharge: HOME | End: 2024-12-10
Attending: INTERNAL MEDICINE
Payer: COMMERCIAL

## 2024-12-10 ENCOUNTER — OFFICE VISIT (OUTPATIENT)
Dept: ENDOCRINOLOGY | Facility: CLINIC | Age: 69
End: 2024-12-10
Payer: COMMERCIAL

## 2024-12-10 VITALS — WEIGHT: 168 LBS | HEIGHT: 65 IN | BODY MASS INDEX: 27.99 KG/M2

## 2024-12-10 VITALS
BODY MASS INDEX: 28.36 KG/M2 | HEIGHT: 65 IN | WEIGHT: 170.19 LBS | DIASTOLIC BLOOD PRESSURE: 65 MMHG | RESPIRATION RATE: 17 BRPM | SYSTOLIC BLOOD PRESSURE: 141 MMHG | HEART RATE: 75 BPM

## 2024-12-10 DIAGNOSIS — L65.9 HAIR LOSS: ICD-10-CM

## 2024-12-10 DIAGNOSIS — R73.01 IMPAIRED FASTING GLUCOSE: ICD-10-CM

## 2024-12-10 DIAGNOSIS — I48.0 PAROXYSMAL ATRIAL FIBRILLATION: ICD-10-CM

## 2024-12-10 DIAGNOSIS — E03.9 ACQUIRED HYPOTHYROIDISM: Primary | ICD-10-CM

## 2024-12-10 DIAGNOSIS — Z12.31 SCREENING MAMMOGRAM FOR BREAST CANCER: ICD-10-CM

## 2024-12-10 PROCEDURE — G2211 COMPLEX E/M VISIT ADD ON: HCPCS | Mod: S$GLB,,, | Performed by: STUDENT IN AN ORGANIZED HEALTH CARE EDUCATION/TRAINING PROGRAM

## 2024-12-10 PROCEDURE — 77067 SCR MAMMO BI INCL CAD: CPT | Mod: TC

## 2024-12-10 PROCEDURE — 1159F MED LIST DOCD IN RCRD: CPT | Mod: CPTII,S$GLB,, | Performed by: STUDENT IN AN ORGANIZED HEALTH CARE EDUCATION/TRAINING PROGRAM

## 2024-12-10 PROCEDURE — 99214 OFFICE O/P EST MOD 30 MIN: CPT | Mod: S$GLB,,, | Performed by: STUDENT IN AN ORGANIZED HEALTH CARE EDUCATION/TRAINING PROGRAM

## 2024-12-10 PROCEDURE — 3008F BODY MASS INDEX DOCD: CPT | Mod: CPTII,S$GLB,, | Performed by: STUDENT IN AN ORGANIZED HEALTH CARE EDUCATION/TRAINING PROGRAM

## 2024-12-10 PROCEDURE — 3044F HG A1C LEVEL LT 7.0%: CPT | Mod: CPTII,S$GLB,, | Performed by: STUDENT IN AN ORGANIZED HEALTH CARE EDUCATION/TRAINING PROGRAM

## 2024-12-10 PROCEDURE — 99999 PR PBB SHADOW E&M-EST. PATIENT-LVL IV: CPT | Mod: PBBFAC,,, | Performed by: STUDENT IN AN ORGANIZED HEALTH CARE EDUCATION/TRAINING PROGRAM

## 2024-12-10 PROCEDURE — 1160F RVW MEDS BY RX/DR IN RCRD: CPT | Mod: CPTII,S$GLB,, | Performed by: STUDENT IN AN ORGANIZED HEALTH CARE EDUCATION/TRAINING PROGRAM

## 2024-12-10 PROCEDURE — 3077F SYST BP >= 140 MM HG: CPT | Mod: CPTII,S$GLB,, | Performed by: STUDENT IN AN ORGANIZED HEALTH CARE EDUCATION/TRAINING PROGRAM

## 2024-12-10 PROCEDURE — 3078F DIAST BP <80 MM HG: CPT | Mod: CPTII,S$GLB,, | Performed by: STUDENT IN AN ORGANIZED HEALTH CARE EDUCATION/TRAINING PROGRAM

## 2024-12-10 RX ORDER — TIRZEPATIDE 2.5 MG/.5ML
2.5 INJECTION, SOLUTION SUBCUTANEOUS
Qty: 2 ML | Refills: 0 | Status: SHIPPED | OUTPATIENT
Start: 2024-12-10

## 2024-12-10 NOTE — ASSESSMENT & PLAN NOTE
Discussed retrying incretin therapy for weight loss and glucose benefit  Will try Mounjaro  Pending insurance approval, could consider Zepbound    Plan  - Start Mounjaro 2.5 mg weekly, increase every 4 weeks as tolerated

## 2024-12-10 NOTE — ASSESSMENT & PLAN NOTE
Discussed unlikely due to thyroid issues at this time given TSH at goal  Suggested collagen supplementation

## 2024-12-10 NOTE — PROGRESS NOTES
"Subjective:      Patient ID: Mei Corbin is a 68 y.o. female.    Chief Complaint:  Hypothyroidism    History of Present Illness  This is a 68 y.o. female. with a past medical history of HTN, Afib, DVT here for evaluation of abnormal thyroid tests.          Hypothyroidism    Etiology:   Component      Latest Ref Rng 2/27/2024   Thyroglobulin Ab Screen      0.0 - 3.9 IU/mL 8.4 (H)    Thyroperoxidase Antibodies      <6.0 IU/mL 1128.1 (H)         Currently on levothyroxine 25 mcg daily    Lab Results   Component Value Date    TSH 2.360 11/26/2024 03/07/12 12:15 03/16/22 17:33 09/19/22 08:05 10/20/23 08:28 11/22/23 11:59 01/23/24 10:47   TSH 0.955 2.470 4.030 (H) 0.229 (L) 0.381 (L) 0.517   Free T4   0.99 0.96 0.87 1.02       Weight:   Wt Readings from Last 6 Encounters:   12/10/24 77.2 kg (170 lb 3.2 oz)   12/10/24 76.2 kg (168 lb)   10/31/24 76.2 kg (168 lb)   02/27/24 77 kg (169 lb 12.1 oz)   11/27/23 75.8 kg (167 lb)   10/23/23 75.8 kg (167 lb)       Noticed history of Afib s/p ablation and on low dose beta blocker  Feels like since stopping levothyroxine has gained weight      Component      Latest Ref Rng 3/28/2024 10/24/2024   Hemoglobin A1C External      4.0 - 5.6 % 5.5  5.6          Thyroid US - None in chart      Review of Systems  As above    Social and family history reviewed  Current medications and allergies reviewed    Objective:   BP (!) 141/65   Pulse 75   Resp 17   Ht 5' 5" (1.651 m)   Wt 77.2 kg (170 lb 3.2 oz)   BMI 28.32 kg/m²   Physical Exam  Alert, oriented    BP Readings from Last 1 Encounters:   12/10/24 (!) 141/65      Wt Readings from Last 1 Encounters:   12/10/24 1338 77.2 kg (170 lb 3.2 oz)     Body mass index is 28.32 kg/m².    Lab Review:   Lab Results   Component Value Date    HGBA1C 5.6 10/24/2024     Lab Results   Component Value Date    CHOL 102 (L) 10/24/2024    HDL 53 10/24/2024    LDLCALC 37.0 (L) 10/24/2024    TRIG 60 10/24/2024    CHOLHDL 52.0 (H) 10/24/2024     Lab " Results   Component Value Date     10/24/2024    K 3.9 10/24/2024     10/24/2024    CO2 24 10/24/2024     10/24/2024    BUN 16 10/24/2024    CREATININE 0.8 10/24/2024    CALCIUM 9.3 10/24/2024    PROT 8.3 10/24/2024    ALBUMIN 3.9 10/24/2024    BILITOT 0.4 10/24/2024    ALKPHOS 94 10/24/2024    AST 18 10/24/2024    ALT 28 10/24/2024    ANIONGAP 11 10/24/2024    ESTGFRAFRICA >60.0 04/25/2022    EGFRNONAA >60.0 04/25/2022    TSH 2.360 11/26/2024       All pertinent labs reviewed    Assessment and Plan     Hypothyroidism  Last TSH at goal for age and co-morbidities  Continue levothyroxine 25 mcg daily    Impaired fasting glucose  Discussed retrying incretin therapy for weight loss and glucose benefit  Will try Mounjaro  Pending insurance approval, could consider Zepbound    Plan  - Start Mounjaro 2.5 mg weekly, increase every 4 weeks as tolerated    Hair loss  Discussed unlikely due to thyroid issues at this time given TSH at goal  Suggested collagen supplementation     Paroxysmal atrial fibrillation  Avoid over treatment with thyroid hormone          Titi Koenig MD  Endocrinology

## 2025-01-03 ENCOUNTER — PATIENT MESSAGE (OUTPATIENT)
Dept: ENDOCRINOLOGY | Facility: CLINIC | Age: 70
End: 2025-01-03
Payer: COMMERCIAL

## 2025-01-06 DIAGNOSIS — E66.3 OVERWEIGHT WITH BODY MASS INDEX (BMI) OF 28 TO 28.9 IN ADULT: Primary | ICD-10-CM

## 2025-01-06 RX ORDER — TIRZEPATIDE 2.5 MG/.5ML
2.5 INJECTION, SOLUTION SUBCUTANEOUS
Qty: 2 ML | Refills: 0 | Status: SHIPPED | OUTPATIENT
Start: 2025-01-06

## 2025-01-09 DIAGNOSIS — E66.3 OVERWEIGHT WITH BODY MASS INDEX (BMI) OF 28 TO 28.9 IN ADULT: ICD-10-CM

## 2025-01-14 ENCOUNTER — DOCUMENTATION ONLY (OUTPATIENT)
Dept: ENDOCRINOLOGY | Facility: CLINIC | Age: 70
End: 2025-01-14
Payer: COMMERCIAL

## 2025-01-14 DIAGNOSIS — E11.65 TYPE 2 DIABETES MELLITUS WITH HYPERGLYCEMIA, WITHOUT LONG-TERM CURRENT USE OF INSULIN: Primary | ICD-10-CM

## 2025-01-14 RX ORDER — TIRZEPATIDE 2.5 MG/.5ML
2.5 INJECTION, SOLUTION SUBCUTANEOUS
Qty: 2 ML | Refills: 0 | Status: SHIPPED | OUTPATIENT
Start: 2025-01-14

## 2025-01-29 ENCOUNTER — PATIENT MESSAGE (OUTPATIENT)
Dept: ENDOCRINOLOGY | Facility: CLINIC | Age: 70
End: 2025-01-29
Payer: COMMERCIAL

## 2025-01-31 ENCOUNTER — PATIENT MESSAGE (OUTPATIENT)
Dept: ENDOCRINOLOGY | Facility: CLINIC | Age: 70
End: 2025-01-31
Payer: COMMERCIAL

## 2025-02-04 ENCOUNTER — TELEPHONE (OUTPATIENT)
Dept: ENDOCRINOLOGY | Facility: CLINIC | Age: 70
End: 2025-02-04
Payer: COMMERCIAL

## 2025-02-04 NOTE — TELEPHONE ENCOUNTER
Spoke with pharmacist regarding prior authorization with medication, pharmacist stated that that they would run it and it would come back closed. Pharmacist stated that he will call insurance to see what the problem is I faxed over the letter to the pharmacy. Updated patient regarding message.     Fax Number: [phone]

## 2025-02-07 ENCOUNTER — TELEPHONE (OUTPATIENT)
Dept: ENDOCRINOLOGY | Facility: CLINIC | Age: 70
End: 2025-02-07
Payer: COMMERCIAL

## 2025-02-07 NOTE — TELEPHONE ENCOUNTER
----- Message from Med Assistant Carrasco sent at 2025 11:03 AM CST -----  Contact: KHOI - [employer]    ----- Message -----  From: Jacqueline Yousif, CHIDI  Sent: 2025   4:04 PM CST  To: [facility] Endocrinology Clinical Support Staff      ----- Message -----  From: Nancy Suarez  Sent: 2025  10:40 AM CST  To: [facility]done[facility] Staff    Mei Corbin  MRN: [id number]  : 1955  PCP: Joy Abraham  Home Phone      [phone]  Work Phone      Not on file.  Mobile          [phone]      MESSAGE: Khoi states that the patient had a change in plans for her BCBS and is needing another prior authorization sent for the patients Ishan.        Phone: [phone]

## 2025-02-10 DIAGNOSIS — Z12.11 SPECIAL SCREENING FOR MALIGNANT NEOPLASMS, COLON: ICD-10-CM

## 2025-02-10 DIAGNOSIS — R19.5 POSITIVE COLORECTAL CANCER SCREENING USING COLOGUARD TEST: Primary | ICD-10-CM

## 2025-02-10 RX ORDER — SODIUM CHLORIDE 9 MG/ML
INJECTION, SOLUTION INTRAVENOUS CONTINUOUS
OUTPATIENT
Start: 2025-02-10

## 2025-02-10 RX ORDER — SODIUM CHLORIDE 0.9 % (FLUSH) 0.9 %
10 SYRINGE (ML) INJECTION
OUTPATIENT
Start: 2025-02-10

## 2025-02-12 ENCOUNTER — HOSPITAL ENCOUNTER (OUTPATIENT)
Dept: PREADMISSION TESTING | Facility: HOSPITAL | Age: 70
Discharge: HOME | End: 2025-02-12
Attending: SURGERY
Payer: COMMERCIAL

## 2025-02-12 ENCOUNTER — HOSPITAL ENCOUNTER (OUTPATIENT)
Dept: PULMONOLOGY | Facility: HOSPITAL | Age: 70
Discharge: HOME | End: 2025-02-12
Attending: SURGERY
Payer: COMMERCIAL

## 2025-02-12 VITALS — WEIGHT: 169 LBS | HEIGHT: 65 IN | BODY MASS INDEX: 28.16 KG/M2

## 2025-02-12 DIAGNOSIS — R19.5 POSITIVE COLORECTAL CANCER SCREENING USING COLOGUARD TEST: ICD-10-CM

## 2025-02-12 DIAGNOSIS — Z12.11 SPECIAL SCREENING FOR MALIGNANT NEOPLASMS, COLON: ICD-10-CM

## 2025-02-12 LAB
OHS QRS DURATION: 84 MS
OHS QTC CALCULATION: 423 MS

## 2025-02-12 PROCEDURE — 93010 ELECTROCARDIOGRAM REPORT: CPT | Mod: ,,, | Performed by: INTERNAL MEDICINE

## 2025-02-12 PROCEDURE — 93005 ELECTROCARDIOGRAM TRACING: CPT

## 2025-02-12 NOTE — PRE-PROCEDURE INSTRUCTIONS
Reviewed pt's history and EKG results with Dr. Martinez. Dr. Martinez is requesting cardiac clearance for procedure on 02/18. Dr. Lund's office notified.

## 2025-02-12 NOTE — DISCHARGE INSTRUCTIONS
BEFORE THE PROCEDURE:    REPORT ANY CHANGE IN YOUR PHYSICAL CONDITION TO YOUR DOCTOR IMMEDIATELY.  SELF ISOLATE AND CHECK TEMPERATURE DAILY, IF TEMP OVER 100, CALL PHYSICIAN IMMEDIATELY.    TRY TO REFRAIN FROM SMOKING AND ALCOHOL 72 HOURS BEFORE YOUR PROCEDURE.     THE DAY BEFORE YOUR PROCEDURE YOU WILL BE ON A  LIQUID DIET FROM WAKING IN THE MORNING UNTIL MIDNIGHT. YOU MAY HAVE WATER ONLY 4 HOURS PRIOR TO ARRIVAL.     REFER TO YOUR PHYSICIANS INSTRUCTIONS ON LIQUID DIET AND COLON PREP.    SOMEONE WILL CALL YOU THE DAY BEFORE YOUR PROCEDURE WITH A CHECK-IN TIME FOR YOUR PROCEDURE.    CHECK IN AT FIRST FLOOR REGISTRATION DESK.     DAY OF YOUR PROCEDURE:    NO MAKE UP, NAIL POLISH OR JEWELRY.  TAKE BLOOD PRESSURE MEDICATIONS THE MORNING OF YOUR PROCEDURE, WITH SMALL SIPS WATER, AS DIRECTED BY YOUR PHYSICIAN.   DO NOT TAKE ANY DIABETIC MEDICATIONS UNLESS DIRECTED TO DO SO BY YOUR PHYSICIAN.   CONTACT LENSES AND DENTURES MUST BE REMOVED.  A RESPONSIBLE ADULT MUST ACCOMPANY YOU HOME UPON DISCHARGE.   ONLY 1 VISITOR ALLOWED PER ROOM.     YOUR THOUGHTS AND OPINIONS HELP US TO BETTER SERVE YOU.     PLEASE PARTICIPATE IN SURVEYS ABOUT YOUR CARE.    THANK YOU FOR CHOOSING [facility].

## 2025-02-14 DIAGNOSIS — E11.65 TYPE 2 DIABETES MELLITUS WITH HYPERGLYCEMIA, WITHOUT LONG-TERM CURRENT USE OF INSULIN: ICD-10-CM

## 2025-02-14 RX ORDER — TIRZEPATIDE 2.5 MG/.5ML
2.5 INJECTION, SOLUTION SUBCUTANEOUS
Qty: 2 ML | Refills: 0 | Status: SHIPPED | OUTPATIENT
Start: 2025-02-14

## 2025-02-17 ENCOUNTER — ANESTHESIA EVENT (OUTPATIENT)
Dept: ENDOSCOPY | Facility: HOSPITAL | Age: 70
End: 2025-02-17
Payer: COMMERCIAL

## 2025-02-17 DIAGNOSIS — E11.65 TYPE 2 DIABETES MELLITUS WITH HYPERGLYCEMIA, WITHOUT LONG-TERM CURRENT USE OF INSULIN: ICD-10-CM

## 2025-02-17 RX ORDER — TIRZEPATIDE 2.5 MG/.5ML
2.5 INJECTION, SOLUTION SUBCUTANEOUS
Qty: 2 ML | Refills: 0 | Status: SHIPPED | OUTPATIENT
Start: 2025-02-17

## 2025-02-17 NOTE — ANESTHESIA PREPROCEDURE EVALUATION
02/17/2025  Mei Corbin is a 69 y.o., female.      Pre-op Assessment    I have reviewed the Patient Summary Reports.    I have reviewed the NPO Status.   I have reviewed the Medications.     Review of Systems  Anesthesia Hx:  No problems with previous Anesthesia             Denies Family Hx of Anesthesia complications.   Personal Hx of Anesthesia complications, Post-Operative Nausea/Vomiting, with every anesthetic, treatment not known                    Social:  Non-Smoker       Cardiovascular:     Hypertension, well controlled    Dysrhythmias atrial fibrillation         ECG has been reviewed. HX PAF   BRIDGED WITH LOVENOX-OFF XARELTO                           Pulmonary:        Sleep Apnea DVT-PE          Education provided regarding risk of obstructive sleep apnea            Renal/:  Renal/ Normal                 Hepatic/GI:  Hepatic/GI Normal                    Neurological:  Neurology Normal                                      Endocrine:  Endocrine Normal              Lab Results   Component Value Date    WBC 8.68 02/12/2025    HGB 15.3 02/12/2025    HCT 47.7 02/12/2025    MCV 92 02/12/2025     02/12/2025      CMP  Sodium   Date Value Ref Range Status   02/12/2025 141 136 - 145 mmol/L Final     Potassium   Date Value Ref Range Status   02/12/2025 4.0 3.5 - 5.1 mmol/L Final     Chloride   Date Value Ref Range Status   02/12/2025 109 95 - 110 mmol/L Final     CO2   Date Value Ref Range Status   02/12/2025 24 23 - 29 mmol/L Final     Glucose   Date Value Ref Range Status   02/12/2025 110 70 - 110 mg/dL Final     BUN   Date Value Ref Range Status   02/12/2025 17 8 - 23 mg/dL Final     Creatinine   Date Value Ref Range Status   02/12/2025 0.7 0.5 - 1.4 mg/dL Final     Calcium   Date Value Ref Range Status   02/12/2025 9.1 8.7 - 10.5 mg/dL Final     Total Protein   Date Value Ref Range Status    02/12/2025 7.9 6.0 - 8.4 g/dL Final     Albumin   Date Value Ref Range Status   02/12/2025 4.0 3.5 - 5.2 g/dL Final     Total Bilirubin   Date Value Ref Range Status   02/12/2025 0.5 0.1 - 1.0 mg/dL Final     Comment:     For infants and newborns, interpretation of results should be based  on gestational age, weight and in agreement with clinical  observations.    Premature Infant recommended reference ranges:  Up to 24 hours.............<8.0 mg/dL  Up to 48 hours............<12.0 mg/dL  3-5 days..................<15.0 mg/dL  6-29 days.................<15.0 mg/dL       Alkaline Phosphatase   Date Value Ref Range Status   02/12/2025 91 55 - 135 U/L Final     AST   Date Value Ref Range Status   02/12/2025 20 10 - 40 U/L Final     ALT   Date Value Ref Range Status   02/12/2025 18 10 - 44 U/L Final     Anion Gap   Date Value Ref Range Status   02/12/2025 8 8 - 16 mmol/L Final     eGFR   Date Value Ref Range Status   02/12/2025 >60.0 >60 mL/min/1.73 m^2 Final        Physical Exam  General: Well nourished    Airway:  Mallampati: III / II  Mouth Opening: Normal  TM Distance: Normal  Tongue: Normal  Neck ROM: Normal ROM    Dental:  Partial Dentures    Chest/Lungs:  Clear to auscultation    Heart:  Rate: Normal  Rhythm: Regular Rhythm  Sounds: Normal        Anesthesia Plan  Type of Anesthesia, risks & benefits discussed:    Anesthesia Type: MAC  Intra-op Monitoring Plan: Standard ASA Monitors  Post Op Pain Control Plan: multimodal analgesia  Induction:  IV  Airway Plan: Direct  Informed Consent: Informed consent signed with the Patient and all parties understand the risks and agree with anesthesia plan.  All questions answered.   ASA Score: 4  Day of Surgery Review of History & Physical: I have interviewed and examined the patient. I have reviewed the patient's H&P dated: There are no significant changes.     Ready For Surgery From Anesthesia Perspective.     .

## 2025-02-18 ENCOUNTER — HOSPITAL ENCOUNTER (OUTPATIENT)
Facility: HOSPITAL | Age: 70
Discharge: HOME | End: 2025-02-18
Attending: SURGERY | Admitting: SURGERY
Payer: COMMERCIAL

## 2025-02-18 ENCOUNTER — ANESTHESIA (OUTPATIENT)
Dept: ENDOSCOPY | Facility: HOSPITAL | Age: 70
End: 2025-02-18
Payer: COMMERCIAL

## 2025-02-18 VITALS
TEMPERATURE: 98 F | OXYGEN SATURATION: 94 % | HEART RATE: 98 BPM | DIASTOLIC BLOOD PRESSURE: 68 MMHG | SYSTOLIC BLOOD PRESSURE: 117 MMHG | RESPIRATION RATE: 16 BRPM

## 2025-02-18 DIAGNOSIS — Z12.11 SPECIAL SCREENING FOR MALIGNANT NEOPLASMS, COLON: ICD-10-CM

## 2025-02-18 DIAGNOSIS — R19.5 POSITIVE COLORECTAL CANCER SCREENING USING COLOGUARD TEST: Primary | ICD-10-CM

## 2025-02-18 DIAGNOSIS — K64.9 HEMORRHOIDS, UNSPECIFIED HEMORRHOID TYPE: ICD-10-CM

## 2025-02-18 PROCEDURE — 99900035 HC TECH TIME PER 15 MIN (STAT)

## 2025-02-18 PROCEDURE — 94640 AIRWAY INHALATION TREATMENT: CPT

## 2025-02-18 PROCEDURE — 99900031 HC PATIENT EDUCATION (STAT)

## 2025-02-18 PROCEDURE — 63600175 PHARM REV CODE 636 W HCPCS: Performed by: NURSE ANESTHETIST, CERTIFIED REGISTERED

## 2025-02-18 PROCEDURE — 37000009 HC ANESTHESIA EA ADD 15 MINS: Performed by: SURGERY

## 2025-02-18 PROCEDURE — 25000003 PHARM REV CODE 250: Performed by: ANESTHESIOLOGY

## 2025-02-18 PROCEDURE — 37000008 HC ANESTHESIA 1ST 15 MINUTES: Performed by: SURGERY

## 2025-02-18 PROCEDURE — G0121 COLON CA SCRN NOT HI RSK IND: HCPCS | Performed by: SURGERY

## 2025-02-18 PROCEDURE — 25000242 PHARM REV CODE 250 ALT 637 W/ HCPCS: Performed by: NURSE ANESTHETIST, CERTIFIED REGISTERED

## 2025-02-18 RX ORDER — DIPHENHYDRAMINE HYDROCHLORIDE 50 MG/ML
25 INJECTION INTRAMUSCULAR; INTRAVENOUS EVERY 6 HOURS PRN
OUTPATIENT
Start: 2025-02-18

## 2025-02-18 RX ORDER — SODIUM CHLORIDE 9 MG/ML
INJECTION, SOLUTION INTRAVENOUS CONTINUOUS
Status: DISCONTINUED | OUTPATIENT
Start: 2025-02-18 | End: 2025-02-18 | Stop reason: HOSPADM

## 2025-02-18 RX ORDER — PROPOFOL 10 MG/ML
INJECTION, EMULSION INTRAVENOUS
Status: DISCONTINUED | OUTPATIENT
Start: 2025-02-18 | End: 2025-02-18

## 2025-02-18 RX ORDER — ONDANSETRON HYDROCHLORIDE 2 MG/ML
INJECTION, SOLUTION INTRAVENOUS
Status: DISCONTINUED | OUTPATIENT
Start: 2025-02-18 | End: 2025-02-18

## 2025-02-18 RX ORDER — GLUCAGON 1 MG
1 KIT INJECTION
OUTPATIENT
Start: 2025-02-18

## 2025-02-18 RX ORDER — SODIUM CHLORIDE 9 MG/ML
INJECTION, SOLUTION INTRAVENOUS CONTINUOUS
OUTPATIENT
Start: 2025-02-18

## 2025-02-18 RX ORDER — ALBUTEROL SULFATE 2.5 MG/.5ML
2.5 SOLUTION RESPIRATORY (INHALATION) ONCE
Status: COMPLETED | OUTPATIENT
Start: 2025-02-18 | End: 2025-02-18

## 2025-02-18 RX ORDER — SODIUM CHLORIDE 0.9 % (FLUSH) 0.9 %
10 SYRINGE (ML) INJECTION
Status: DISCONTINUED | OUTPATIENT
Start: 2025-02-18 | End: 2025-02-18 | Stop reason: HOSPADM

## 2025-02-18 RX ORDER — ONDANSETRON HYDROCHLORIDE 2 MG/ML
4 INJECTION, SOLUTION INTRAVENOUS DAILY PRN
OUTPATIENT
Start: 2025-02-18

## 2025-02-18 RX ORDER — SCOPOLAMINE 1 MG/3D
1 PATCH, EXTENDED RELEASE TRANSDERMAL
Status: DISCONTINUED | OUTPATIENT
Start: 2025-02-18 | End: 2025-02-18 | Stop reason: HOSPADM

## 2025-02-18 RX ADMIN — PROPOFOL 40 MG: 10 INJECTION, EMULSION INTRAVENOUS at 10:02

## 2025-02-18 RX ADMIN — ONDANSETRON 4 MG: 2 INJECTION INTRAMUSCULAR; INTRAVENOUS at 10:02

## 2025-02-18 RX ADMIN — PROPOFOL 80 MG: 10 INJECTION, EMULSION INTRAVENOUS at 10:02

## 2025-02-18 RX ADMIN — ALBUTEROL SULFATE 2.5 MG: 2.5 SOLUTION RESPIRATORY (INHALATION) at 11:02

## 2025-02-18 NOTE — DISCHARGE SUMMARY
Discharge Summary  General Surgery      Admit Date: 2/18/2025    Discharge Date :2/18/2025    Attending Physician: Therese Lund     Discharge Physician: Therese Lund    Discharged Condition: good    Discharge Diagnosis: Positive colorectal cancer screening using Cologuard test [R19.5]  Special screening for malignant neoplasms, colon [Z12.11]  Hemorrhoids    Treatments/Procedures: Procedure(s) (LRB):  COLONOSCOPY (N/A)    Hospital Course: Uneventful; Discharged home from Recovery    Significant Diagnostic Studies: none    Disposition: Home or Self Care    Diet: Regular    Follow up: Office 10-14 days    Activity: No restrictions    Patient Instructions:   Current Discharge Medication List        CONTINUE these medications which have NOT CHANGED    Details   coenzyme Q10 100 mg capsule Take 100 mg by mouth once daily.      cyanocobalamin 1,000 mcg/mL injection Inject 1 mL (1,000 mcg total) into the muscle every 14 (fourteen) days.  Qty: 10 mL, Refills: 2    Comments: This prescription was filled on 10/1/2024. Any refills authorized will be placed on file.  Associated Diagnoses: Herpesviral vesicular dermatitis      ezetimibe (ZETIA) 10 mg tablet Take 10 mg by mouth once daily.      gabapentin (NEURONTIN) 400 MG capsule TAKE 1 CAPSULE BY MOUTH EVERY EVENING WILL MAKE DROWSY  Qty: 90 capsule, Refills: 3    Associated Diagnoses: Osteopenia, unspecified location; Chronic nonintractable headache, unspecified headache type      levothyroxine (SYNTHROID) 25 MCG tablet Take 1 tablet (25 mcg total) by mouth once daily.  Qty: 30 tablet, Refills: 11    Associated Diagnoses: Acquired hypothyroidism      nebivoloL (BYSTOLIC) 2.5 MG Tab Take 1 tablet (2.5 mg total) by mouth once daily.      pimecrolimus (ELIDEL) 1 % cream as needed.      pravastatin (PRAVACHOL) 40 MG tablet Take 40 mg by mouth once daily.      SOOLANTRA 1 % Crea       topiramate (TOPAMAX) 50 MG tablet TAKE 1 TABLET BY MOUTH ONCE DAILY  Qty: 30 tablet,  "Refills: 4    Comments: This prescription was filled on 9/16/2024. Any refills authorized will be placed on file.  Associated Diagnoses: Chronic nonintractable headache, unspecified headache type      furosemide (LASIX) 20 MG tablet Take 20 mg by mouth 2 (two) times daily as needed.      hydrocortisone 2.5 % cream Apply topically 4 (four) times daily. Prn hemorrhoids  Qty: 28 g, Refills: 1      MOUNJARO 2.5 mg/0.5 mL PnIj Inject 2.5 mg into the skin every 7 days.  Qty: 2 mL, Refills: 0    Comments: Medication was approved after doing PA and appeal  Associated Diagnoses: Type 2 diabetes mellitus with hyperglycemia, without long-term current use of insulin      needle, disp, 22 G 22 gauge x 1 1/2" Ndle Use to inject b12 injections every 2 weeks.  Qty: 20 each, Refills: 3    Associated Diagnoses: B12 deficiency      ondansetron (ZOFRAN) 4 MG tablet Take 1 tablet (4 mg total) by mouth every 8 (eight) hours as needed for Nausea.  Qty: 30 tablet, Refills: 0    Associated Diagnoses: Nausea      valACYclovir (VALTREX) 500 MG tablet TAKE 1 TABLET BY MOUTH TWICE DAILY AS NEEDED FEVER blister  Qty: 30 tablet, Refills: 2    Associated Diagnoses: Fever blister      XARELTO 20 mg Tab              Discharge Procedure Orders   Diet general     Call MD for:  temperature >100.4     Call MD for:  persistent nausea and vomiting     Call MD for:  difficulty breathing, headache or visual disturbances     Call MD for:  severe uncontrolled pain     Call MD for:  extreme fatigue     Call MD for:  persistent dizziness or light-headedness     Activity as tolerated     "

## 2025-02-18 NOTE — PLAN OF CARE
12:45 p.m  PATIENT READY FOR DISCHARGE .  PATIENT GOT SICK WHEN WHEELING HER OUT . B/P 112/65,--- P=96, --- SPO2 95% . RETURN PATIENT BACK TO ASSIGN ROOM .

## 2025-02-18 NOTE — OP NOTE
[facility] - Endoscopy  Colonoscopy Procedure  Operative Note    SUMMARY     Date of Procedure: 2/18/2025     Procedure: Procedure(s) (LRB):  COLONOSCOPY (N/A)    Surgeons and Role:     * Therese Lund MD - Primary    Assisting Surgeon: None     Patient location: GI    Pre-Operative Diagnosis: Positive colorectal cancer screening using Cologuard test [R19.5]  Special screening for malignant neoplasms, colon [Z12.11]    Post-Operative Diagnosis: Post-Op Diagnosis Codes:     * Positive colorectal cancer screening using Cologuard test [R19.5]     * Special screening for malignant neoplasms, colon [Z12.11]  Grade 2 hemorrhoids     Indications:  Positive Cologuard in need of screening colonoscopy          Procedure:                  The patient was brought in to the endoscopy suite where the risks, benefits, and alternatives of the procedure were described.  The patient was given the opportunity to ask questions and then signed informed consent.  Patient was positioned in the left lateral decubitus position, continuous monitoring was initiated, and supplemental oxygen was provided via nasal cannula.  Adequate sedation was achieved with the above mentioned medications and then titrated during the entire procedure.  Digital rectal exam was performed.  Under direct visualization the colonoscope was introduced through the anus in to the rectum.  The scope was then advanced to the cecum, which was identified by the ileocecal valve and appendiceal orifice.  Scope was then withdrawn and the mucosa was carefully examined in a circular fashion.  The entire colonic mucosa was examined.  Air was evacuated from the colon and the procedure was terminated.  The patient tolerated the procedure well and was able to be transferred to the recovery area in stable condition.    Findings:                 Digital rectal examination:  No palpable abnormality.  Grade 2 hemorrhoids appreciated                  Rectum:  No mucosal  abnormalities                    Sigmoid:  No mucosal abnormality        Descending:  No mucosal abnormalities         Transverse:  No mucosal abnormalities         Ascending:  No mucosal abnormalities        Cecum:  No mucosal abnormalities.  Appendiceal orifice and ileocecal valve appreciated.        Terminal Ileum:  Not intubated     Specimens:   Specimen (24h ago, onward)      None              Estimated Blood Loss (EBL): * No values recorded between 2/18/2025  9:59 AM and 2/18/2025 10:46 AM *     Complications: No     Diagnostic Impression:  Grade 2 hemorrhoids     Recommendations: Discharge patient to home. Patient has a contact number available for emergencies. The signs and symptoms of potential delayed complications were discussed with the patient. Return to normal activities tomorrow. Written discharge instructions were provided to the patient. Resume previous diet. Continue present medications. Repeat procedure in 10 years for screening.    Disposition:  Recovery unit stable     Attestation: I performed the procedure.        Follow Up:             Future Appointments   Date Time Provider Department Center   5/6/2025  9:30 AM Joy Abraham MD [facility]   7/3/2025  9:00 AM LAB, [facility] LAB [facility]    7/10/2025  8:00 AM Titi Koenig MD [facility] ENDO [facility]            Therese Lund MD  2/18/2025

## 2025-02-18 NOTE — PLAN OF CARE
"Pt denying nausea and stating she feels "so much better" after breathing treatment.     Dr. Martinez in to assess pt.   "

## 2025-02-18 NOTE — PLAN OF CARE
Dr. Martinez spoke to Dr. Chou. Instructed pt to resume xarelto when home and follow up with Dr. Chou. Dr. Martinez ok for pt to return to same-day surgery.

## 2025-02-18 NOTE — TRANSFER OF CARE
Anesthesia Transfer of Care Note    Patient: Mei Corbin    Procedure(s) Performed: Procedure(s) (LRB):  COLONOSCOPY (N/A)    Patient location: PACU    Anesthesia Type: MAC    Transport from OR: Transported from OR on room air with adequate spontaneous ventilation    Post pain: adequate analgesia    Post assessment: no apparent anesthetic complications    Post vital signs: stable    Level of consciousness: awake, alert and oriented    Nausea/Vomiting: no nausea/vomiting    Complications: none    Transfer of care protocol was followedComments: Pt in no acute distress, good color and respiratory effort. To PACU for ongoing monitoring and breathing tx. Dr. Martinez made aware.      Last vitals:     /65  P 84  R 25  O2 Sat 86% 4L NC

## 2025-02-19 NOTE — ANESTHESIA POSTPROCEDURE EVALUATION
Anesthesia Post Evaluation    Patient: Mei Corbin    Procedure(s) Performed: Procedure(s) (LRB):  COLONOSCOPY (N/A)    Final Anesthesia Type: MAC      Patient location during evaluation: PACU  Patient participation: Yes- Able to Participate  Level of consciousness: oriented and awake and alert  Post-procedure vital signs: reviewed and stable  Pain management: adequate  Airway patency: patent    PONV status at discharge: No PONV  Anesthetic complications: no      Cardiovascular status: blood pressure returned to baseline  Respiratory status: spontaneous ventilation, unassisted and room air  Hydration status: euvolemic  Follow-up not needed.            Vitals Value Taken Time   /68 02/18/25 11:50   Temp 36.7 °C (98 °F) 02/18/25 11:50   Pulse 98 02/18/25 11:50   Resp 16 02/18/25 11:50   SpO2 94 % 02/18/25 11:50         Event Time   Out of Recovery 11:42:30         Pain/Marielle Score: Marielle Score: 10 (2/18/2025 11:50 AM)           ambulatory

## 2025-03-10 DIAGNOSIS — K21.9 GASTROESOPHAGEAL REFLUX DISEASE, UNSPECIFIED WHETHER ESOPHAGITIS PRESENT: Primary | ICD-10-CM

## 2025-03-10 DIAGNOSIS — R13.10 DYSPHAGIA, UNSPECIFIED TYPE: ICD-10-CM

## 2025-03-24 ENCOUNTER — PATIENT MESSAGE (OUTPATIENT)
Dept: ENDOCRINOLOGY | Facility: CLINIC | Age: 70
End: 2025-03-24
Payer: COMMERCIAL

## 2025-03-24 DIAGNOSIS — E03.9 ACQUIRED HYPOTHYROIDISM: ICD-10-CM

## 2025-03-24 DIAGNOSIS — E11.65 TYPE 2 DIABETES MELLITUS WITH HYPERGLYCEMIA, WITHOUT LONG-TERM CURRENT USE OF INSULIN: ICD-10-CM

## 2025-03-24 RX ORDER — LEVOTHYROXINE SODIUM 25 UG/1
25 TABLET ORAL DAILY
Qty: 30 TABLET | Refills: 11 | Status: SHIPPED | OUTPATIENT
Start: 2025-03-24

## 2025-03-24 RX ORDER — LEVOTHYROXINE SODIUM 25 UG/1
25 TABLET ORAL DAILY
Qty: 30 TABLET | Refills: 11 | Status: CANCELLED | OUTPATIENT
Start: 2025-03-24

## 2025-03-24 RX ORDER — TIRZEPATIDE 2.5 MG/.5ML
2.5 INJECTION, SOLUTION SUBCUTANEOUS
Qty: 2 ML | Refills: 0 | Status: CANCELLED | OUTPATIENT
Start: 2025-03-24

## 2025-03-25 ENCOUNTER — PATIENT MESSAGE (OUTPATIENT)
Dept: ENDOCRINOLOGY | Facility: CLINIC | Age: 70
End: 2025-03-25
Payer: COMMERCIAL

## 2025-03-25 DIAGNOSIS — E11.65 TYPE 2 DIABETES MELLITUS WITH HYPERGLYCEMIA, WITHOUT LONG-TERM CURRENT USE OF INSULIN: ICD-10-CM

## 2025-03-25 RX ORDER — TIRZEPATIDE 2.5 MG/.5ML
2.5 INJECTION, SOLUTION SUBCUTANEOUS
Qty: 2 ML | Refills: 0 | OUTPATIENT
Start: 2025-03-25

## 2025-03-26 DIAGNOSIS — E11.65 TYPE 2 DIABETES MELLITUS WITH HYPERGLYCEMIA, WITHOUT LONG-TERM CURRENT USE OF INSULIN: ICD-10-CM

## 2025-04-04 ENCOUNTER — HOSPITAL ENCOUNTER (OUTPATIENT)
Dept: RADIOLOGY | Facility: HOSPITAL | Age: 70
Discharge: HOME | End: 2025-04-04
Attending: SURGERY
Payer: COMMERCIAL

## 2025-04-04 DIAGNOSIS — K21.9 GASTROESOPHAGEAL REFLUX DISEASE, UNSPECIFIED WHETHER ESOPHAGITIS PRESENT: ICD-10-CM

## 2025-04-04 DIAGNOSIS — R13.10 DYSPHAGIA, UNSPECIFIED TYPE: ICD-10-CM

## 2025-04-04 PROCEDURE — A9698 NON-RAD CONTRAST MATERIALNOC: HCPCS | Performed by: SURGERY

## 2025-04-04 PROCEDURE — 74240 X-RAY XM UPR GI TRC 1CNTRST: CPT | Mod: TC

## 2025-04-04 PROCEDURE — 25500020 PHARM REV CODE 255: Performed by: SURGERY

## 2025-04-04 RX ADMIN — BARIUM SULFATE 135 ML: 980 POWDER, FOR SUSPENSION ORAL at 09:04

## 2025-04-04 RX ADMIN — BARIUM SULFATE 176 G: 960 POWDER, FOR SUSPENSION ORAL at 09:04

## 2025-04-13 ENCOUNTER — PATIENT MESSAGE (OUTPATIENT)
Dept: ENDOCRINOLOGY | Facility: CLINIC | Age: 70
End: 2025-04-13
Payer: COMMERCIAL

## 2025-04-14 DIAGNOSIS — E11.65 TYPE 2 DIABETES MELLITUS WITH HYPERGLYCEMIA, WITHOUT LONG-TERM CURRENT USE OF INSULIN: Primary | ICD-10-CM

## 2025-04-14 RX ORDER — TIRZEPATIDE 5 MG/.5ML
5 INJECTION, SOLUTION SUBCUTANEOUS
Qty: 2 ML | Refills: 11 | Status: SHIPPED | OUTPATIENT
Start: 2025-04-14

## 2025-06-09 DIAGNOSIS — R93.3 ABNORMAL FINDINGS ON EXAMINATION OF GASTROINTESTINAL TRACT: Primary | ICD-10-CM

## 2025-06-09 DIAGNOSIS — R11.14 BILIOUS VOMITING WITH NAUSEA: ICD-10-CM

## 2025-06-23 ENCOUNTER — HOSPITAL ENCOUNTER (OUTPATIENT)
Dept: RADIOLOGY | Facility: HOSPITAL | Age: 70
Discharge: HOME | End: 2025-06-23
Attending: SURGERY
Payer: COMMERCIAL

## 2025-06-23 DIAGNOSIS — R11.14 BILIOUS VOMITING WITH NAUSEA: ICD-10-CM

## 2025-06-23 DIAGNOSIS — R93.3 ABNORMAL FINDINGS ON EXAMINATION OF GASTROINTESTINAL TRACT: ICD-10-CM

## 2025-06-23 PROCEDURE — A9541 TC99M SULFUR COLLOID: HCPCS | Performed by: SURGERY

## 2025-06-23 PROCEDURE — 78264 GASTRIC EMPTYING IMG STUDY: CPT | Mod: TC

## 2025-06-23 RX ORDER — TECHNETIUM TC 99M SULFUR COLLOID 2 MG
1.2 KIT MISCELLANEOUS
Status: COMPLETED | OUTPATIENT
Start: 2025-06-23 | End: 2025-06-23

## 2025-06-23 RX ADMIN — TECHNETIUM TC 99M SULFUR COLLOID 1.2 MILLICURIE: KIT at 12:06

## 2025-06-30 ENCOUNTER — TELEPHONE (OUTPATIENT)
Dept: PHARMACY | Facility: CLINIC | Age: 70
End: 2025-06-30
Payer: COMMERCIAL

## 2025-06-30 NOTE — TELEPHONE ENCOUNTER
[facility]/Population Health Chart Review & Patient Outreach Details For Medication Adherence Project    Reason for Outreach Encounter: 3rd Party payor non-compliance report ([employer], [employer], C, etc)  2.  Patient Outreach Method: Reviewed Patient Chart  3.   Medication in question: Mounjaro 5mg/dose   LAST FILLED: 6/9/25 for 28 day supply  Diabetes Medications              MOUNJARO 5 mg/0.5 mL PnIj Inject 5 mg into the skin every 7 days.              4.  Reviewed and or Updates Made To: Patient Chart  5. Outreach Outcomes and/or actions taken: Patient filled medication and is on track to be adherent

## 2025-07-03 ENCOUNTER — LAB VISIT (OUTPATIENT)
Dept: LAB | Facility: HOSPITAL | Age: 70
End: 2025-07-03
Attending: STUDENT IN AN ORGANIZED HEALTH CARE EDUCATION/TRAINING PROGRAM
Payer: COMMERCIAL

## 2025-07-03 DIAGNOSIS — E03.9 ACQUIRED HYPOTHYROIDISM: ICD-10-CM

## 2025-07-03 LAB
T4 FREE SERPL-MCNC: 1.16 NG/DL (ref 0.71–1.51)
T4 FREE SERPL-MCNC: 1.16 NG/DL (ref 0.71–1.51)
TSH SERPL-ACNC: 3.25 UIU/ML (ref 0.4–4)

## 2025-07-03 PROCEDURE — 84439 ASSAY OF FREE THYROXINE: CPT

## 2025-07-03 PROCEDURE — 36415 COLL VENOUS BLD VENIPUNCTURE: CPT

## 2025-07-10 ENCOUNTER — OFFICE VISIT (OUTPATIENT)
Dept: ENDOCRINOLOGY | Facility: CLINIC | Age: 70
End: 2025-07-10
Payer: COMMERCIAL

## 2025-07-10 VITALS
OXYGEN SATURATION: 94 % | BODY MASS INDEX: 26.96 KG/M2 | WEIGHT: 161.81 LBS | SYSTOLIC BLOOD PRESSURE: 118 MMHG | DIASTOLIC BLOOD PRESSURE: 68 MMHG | RESPIRATION RATE: 18 BRPM | HEIGHT: 65 IN | HEART RATE: 83 BPM

## 2025-07-10 DIAGNOSIS — E06.3 HYPOTHYROIDISM DUE TO HASHIMOTO THYROIDITIS: Primary | ICD-10-CM

## 2025-07-10 DIAGNOSIS — R73.01 IMPAIRED FASTING GLUCOSE: ICD-10-CM

## 2025-07-10 PROCEDURE — 1159F MED LIST DOCD IN RCRD: CPT | Mod: CPTII,S$GLB,, | Performed by: STUDENT IN AN ORGANIZED HEALTH CARE EDUCATION/TRAINING PROGRAM

## 2025-07-10 PROCEDURE — 3078F DIAST BP <80 MM HG: CPT | Mod: CPTII,S$GLB,, | Performed by: STUDENT IN AN ORGANIZED HEALTH CARE EDUCATION/TRAINING PROGRAM

## 2025-07-10 PROCEDURE — 3008F BODY MASS INDEX DOCD: CPT | Mod: CPTII,S$GLB,, | Performed by: STUDENT IN AN ORGANIZED HEALTH CARE EDUCATION/TRAINING PROGRAM

## 2025-07-10 PROCEDURE — 99999 PR PBB SHADOW E&M-EST. PATIENT-LVL IV: CPT | Mod: PBBFAC,,, | Performed by: STUDENT IN AN ORGANIZED HEALTH CARE EDUCATION/TRAINING PROGRAM

## 2025-07-10 PROCEDURE — 1160F RVW MEDS BY RX/DR IN RCRD: CPT | Mod: CPTII,S$GLB,, | Performed by: STUDENT IN AN ORGANIZED HEALTH CARE EDUCATION/TRAINING PROGRAM

## 2025-07-10 PROCEDURE — 3288F FALL RISK ASSESSMENT DOCD: CPT | Mod: CPTII,S$GLB,, | Performed by: STUDENT IN AN ORGANIZED HEALTH CARE EDUCATION/TRAINING PROGRAM

## 2025-07-10 PROCEDURE — 3074F SYST BP LT 130 MM HG: CPT | Mod: CPTII,S$GLB,, | Performed by: STUDENT IN AN ORGANIZED HEALTH CARE EDUCATION/TRAINING PROGRAM

## 2025-07-10 PROCEDURE — 99214 OFFICE O/P EST MOD 30 MIN: CPT | Mod: S$GLB,,, | Performed by: STUDENT IN AN ORGANIZED HEALTH CARE EDUCATION/TRAINING PROGRAM

## 2025-07-10 PROCEDURE — G2211 COMPLEX E/M VISIT ADD ON: HCPCS | Mod: S$GLB,,, | Performed by: STUDENT IN AN ORGANIZED HEALTH CARE EDUCATION/TRAINING PROGRAM

## 2025-07-10 PROCEDURE — 1101F PT FALLS ASSESS-DOCD LE1/YR: CPT | Mod: CPTII,S$GLB,, | Performed by: STUDENT IN AN ORGANIZED HEALTH CARE EDUCATION/TRAINING PROGRAM

## 2025-07-10 RX ORDER — LINACLOTIDE 72 UG/1
72 CAPSULE, GELATIN COATED ORAL
COMMUNITY
Start: 2025-06-16

## 2025-07-10 NOTE — PROGRESS NOTES
"Subjective:      Patient ID: Mei Corbin is a 69 y.o. female.    Chief Complaint:  Hypothyroidism    History of Present Illness  This is a 69 y.o. female. with a past medical history of HTN, Afib, DVT here for hypothyroidism, prediabetes      Hypothyroidism    Etiology:   Component 2/27/2024   Thyroglobulin Ab Screen 8.4 (H)    Thyroperoxidase Antibodies 1128.1 (H)         Currently on levothyroxine 25 mcg daily    Lab Results   Component Value Date    TSH 3.252 07/03/2025 03/07/12 12:15 03/16/22 17:33 09/19/22 08:05 10/20/23 08:28 11/22/23 11:59 01/23/24 10:47   TSH 0.955 2.470 4.030 (H) 0.229 (L) 0.381 (L) 0.517   Free T4   0.99 0.96 0.87 1.02      02/27/24 10:21 03/28/24 08:00 05/30/24 13:36 11/26/24 10:58 07/03/25 09:02   TSH 1.650 2.720 0.781 2.360 3.252   T3, Total 96 99 89 92    Free T4 0.77 0.97 1.03 1.04 1.16         Weight:   Wt Readings from Last 6 Encounters:   07/10/25 73.4 kg (161 lb 12.8 oz)   02/12/25 76.7 kg (169 lb)   12/10/24 76.2 kg (168 lb)   12/10/24 77.2 kg (170 lb 3.2 oz)   10/31/24 76.2 kg (168 lb)   02/27/24 77 kg (169 lb 12.1 oz)     Noticed history of Afib s/p ablation and on low dose beta blocker      Prediabetes  She is on Mounjaro 5 mg weekly    Component      Latest Ref Rng 3/28/2024 10/24/2024   Hemoglobin A1C External      4.0 - 5.6 % 5.5  5.6          Review of Systems  As above    Social and family history reviewed  Current medications and allergies reviewed    Objective:   /68   Pulse 83   Resp 18   Ht 5' 5" (1.651 m)   Wt 73.4 kg (161 lb 12.8 oz)   SpO2 (!) 94%   BMI 26.92 kg/m²   Physical Exam  Alert, oriented    BP Readings from Last 1 Encounters:   07/10/25 118/68      Wt Readings from Last 1 Encounters:   07/10/25 0757 73.4 kg (161 lb 12.8 oz)     Body mass index is 26.92 kg/m².    Lab Review:   Lab Results   Component Value Date    HGBA1C 5.6 10/24/2024     Lab Results   Component Value Date    CHOL 102 (L) 10/24/2024    HDL 53 10/24/2024    LDLCALC " 37.0 (L) 10/24/2024    TRIG 60 10/24/2024    CHOLHDL 52.0 (H) 10/24/2024     Lab Results   Component Value Date     02/12/2025    K 4.0 02/12/2025     02/12/2025    CO2 24 02/12/2025     02/12/2025    BUN 17 02/12/2025    CREATININE 0.7 02/12/2025    CALCIUM 9.1 02/12/2025    PROT 7.9 02/12/2025    ALBUMIN 4.0 02/12/2025    BILITOT 0.5 02/12/2025    ALKPHOS 91 02/12/2025    AST 20 02/12/2025    ALT 18 02/12/2025    ANIONGAP 8 02/12/2025    ESTGFRAFRICA >60.0 04/25/2022    EGFRNONAA >60.0 04/25/2022    TSH 3.252 07/03/2025       All pertinent labs reviewed    Assessment and Plan     Hypothyroidism  Last TSH at goal for age and co-morbidities  Continue levothyroxine 25 mcg daily  She has repeat labs with PCP in Nov 2025, will follow    Impaired fasting glucose  Tolerating GLP-1/GIP RA. Keep same dose given good weight loss rate and some rare episodes of hypoglycemia.    Plan  - Continue Mounjaro 5 mg weekly  - A1c with PCP in Nov 2025    BMI 26.0-26.9,adult  Continue GLP-1/GIP RA given weight loss benefit  Prioritize protein and fiber. Discussed resistance training as tolerated to aide with muscle mass maintenance.        Titi Koenig MD   Endocrinology

## 2025-07-10 NOTE — ASSESSMENT & PLAN NOTE
Continue GLP-1/GIP RA given weight loss benefit  Prioritize protein and fiber. Discussed resistance training as tolerated to aide with muscle mass maintenance.

## 2025-07-10 NOTE — ASSESSMENT & PLAN NOTE
Tolerating GLP-1/GIP RA. Keep same dose given good weight loss rate and some rare episodes of hypoglycemia.    Plan  - Continue Mounjaro 5 mg weekly  - A1c with PCP in Nov 2025

## 2025-07-10 NOTE — ASSESSMENT & PLAN NOTE
Last TSH at goal for age and co-morbidities  Continue levothyroxine 25 mcg daily  She has repeat labs with PCP in Nov 2025, will follow

## 2025-07-29 ENCOUNTER — PATIENT MESSAGE (OUTPATIENT)
Dept: ENDOCRINOLOGY | Facility: CLINIC | Age: 70
End: 2025-07-29
Payer: COMMERCIAL

## 2025-08-06 ENCOUNTER — PATIENT MESSAGE (OUTPATIENT)
Dept: ENDOCRINOLOGY | Facility: CLINIC | Age: 70
End: 2025-08-06
Payer: COMMERCIAL

## 2025-08-07 DIAGNOSIS — E11.65 TYPE 2 DIABETES MELLITUS WITH HYPERGLYCEMIA, WITHOUT LONG-TERM CURRENT USE OF INSULIN: Primary | ICD-10-CM

## 2025-08-07 RX ORDER — TIRZEPATIDE 7.5 MG/.5ML
7.5 INJECTION, SOLUTION SUBCUTANEOUS
Qty: 4 PEN | Refills: 11 | Status: SHIPPED | OUTPATIENT
Start: 2025-08-07

## 2025-08-11 ENCOUNTER — TELEPHONE (OUTPATIENT)
Dept: ENDOCRINOLOGY | Facility: CLINIC | Age: 70
End: 2025-08-11
Payer: COMMERCIAL

## 2025-08-13 ENCOUNTER — TELEPHONE (OUTPATIENT)
Dept: ENDOCRINOLOGY | Facility: CLINIC | Age: 70
End: 2025-08-13
Payer: COMMERCIAL

## 2025-08-19 ENCOUNTER — PATIENT MESSAGE (OUTPATIENT)
Dept: ENDOCRINOLOGY | Facility: CLINIC | Age: 70
End: 2025-08-19
Payer: COMMERCIAL

## 2025-08-29 ENCOUNTER — PATIENT MESSAGE (OUTPATIENT)
Dept: ENDOCRINOLOGY | Facility: CLINIC | Age: 70
End: 2025-08-29
Payer: COMMERCIAL

## (undated) DEVICE — UNDERPAD DELUXE FLUFF 30X30IN

## (undated) DEVICE — TUBING SUC UNIV W/CONN 12FT

## (undated) DEVICE — TUBING IRRIGATION W/ AIR

## (undated) DEVICE — SPONGE DRY VIA GREEN

## (undated) DEVICE — GOWN NONREINF SET-IN SLV XL

## (undated) DEVICE — CANNULA SSOFT C02 MALE 14FT

## (undated) DEVICE — TIP YANKAUERS BULB NO VENT

## (undated) DEVICE — KIT BIOGUARD AIR WTR SUC VALVE

## (undated) DEVICE — ELECTRODE MEDI-TRACE 855 FOAM

## (undated) DEVICE — CONNECTOR WATERJET ENDO

## (undated) DEVICE — KIT VIA CUSTOM PROCEDURE

## (undated) DEVICE — SOL IRRI STRL WATER 1000ML

## (undated) DEVICE — LINER SUCTION CANNISTER REGUGA